# Patient Record
Sex: FEMALE | Race: WHITE | NOT HISPANIC OR LATINO | Employment: UNEMPLOYED | URBAN - METROPOLITAN AREA
[De-identification: names, ages, dates, MRNs, and addresses within clinical notes are randomized per-mention and may not be internally consistent; named-entity substitution may affect disease eponyms.]

---

## 2017-11-25 ENCOUNTER — HOSPITAL ENCOUNTER (EMERGENCY)
Facility: CLINIC | Age: 32
Discharge: HOME | End: 2017-11-25
Attending: EMERGENCY MEDICINE | Admitting: EMERGENCY MEDICINE
Payer: COMMERCIAL

## 2017-11-25 VITALS
HEIGHT: 69 IN | DIASTOLIC BLOOD PRESSURE: 91 MMHG | SYSTOLIC BLOOD PRESSURE: 146 MMHG | OXYGEN SATURATION: 97 % | RESPIRATION RATE: 18 BRPM | TEMPERATURE: 98 F
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
## 2017-11-25 DIAGNOSIS — R04.0 EPISTAXIS: ICD-10-CM

## 2017-11-25 DIAGNOSIS — F10.929 ALCOHOLIC INTOXICATION WITH COMPLICATION (H): ICD-10-CM

## 2017-11-25 LAB — ALCOHOL BREATH TEST: 0.34 (ref 0–0.01)

## 2017-11-25 PROCEDURE — 99283 EMERGENCY DEPT VISIT LOW MDM: CPT

## 2017-11-25 PROCEDURE — 82075 ASSAY OF BREATH ETHANOL: CPT

## 2017-11-25 NOTE — ED AVS SNAPSHOT
Emergency Department    [address] [place] [address]    Phone:  [phone]    Fax:  [phone]                                       Shala Mae   MRN: [id number]    Department:   Emergency Department   Date of Visit:  11/25/2017           After Visit Summary Signature Page     I have received my discharge instructions, and my questions have been answered. I have discussed any challenges I see with this plan with the nurse or doctor.    ..........................................................................................................................................  Patient/Patient Representative Signature      ..........................................................................................................................................  Patient Representative Print Name and Relationship to Patient    ..................................................               ................................................  Date                                            Time    ..........................................................................................................................................  Reviewed by Signature/Title    ...................................................              ..............................................  Date                                                            Time
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
## 2017-11-25 NOTE — ED NOTES
Bed: ED18  Expected date:   Expected time:   Means of arrival:   Comments:  422 32 F   ETOH  Head/neck pain

## 2017-11-26 NOTE — ED NOTES
Pt given boxed lunch. Ambulatory in room without difficulty. Still denying wanting PD notified or help with ETOH detox.
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
## 2017-11-26 NOTE — ED NOTES
Pt's father initiated call to ED and stated that he will not be coming to the ED. Dr. Lee at bedside.
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
## 2018-03-22 ENCOUNTER — HOSPITAL ENCOUNTER (EMERGENCY)
Facility: CLINIC | Age: 33
Discharge: OTHER INSTITUTION | End: 2018-03-22
Attending: EMERGENCY MEDICINE | Admitting: EMERGENCY MEDICINE
Payer: COMMERCIAL

## 2018-03-22 VITALS
RESPIRATION RATE: 18 BRPM | SYSTOLIC BLOOD PRESSURE: 122 MMHG | DIASTOLIC BLOOD PRESSURE: 80 MMHG | OXYGEN SATURATION: 98 % | HEART RATE: 120 BPM | TEMPERATURE: 98.5 F

## 2018-03-22 DIAGNOSIS — F10.220 ACUTE ALCOHOLIC INTOXICATION IN ALCOHOLISM WITHOUT COMPLICATION (H): ICD-10-CM

## 2018-03-22 LAB
AMPHETAMINES UR QL SCN: NEGATIVE
BARBITURATES UR QL: NEGATIVE
BENZODIAZ UR QL: NEGATIVE
CANNABINOIDS UR QL SCN: NEGATIVE
COCAINE UR QL: NEGATIVE
ETHANOL SERPL-MCNC: 0.38 G/DL
HCG UR QL: NEGATIVE
OPIATES UR QL SCN: NEGATIVE
PCP UR QL SCN: NEGATIVE

## 2018-03-22 PROCEDURE — 80307 DRUG TEST PRSMV CHEM ANLYZR: CPT | Performed by: EMERGENCY MEDICINE

## 2018-03-22 PROCEDURE — 99285 EMERGENCY DEPT VISIT HI MDM: CPT

## 2018-03-22 PROCEDURE — 80320 DRUG SCREEN QUANTALCOHOLS: CPT | Mod: 59 | Performed by: EMERGENCY MEDICINE

## 2018-03-22 PROCEDURE — 81025 URINE PREGNANCY TEST: CPT | Performed by: EMERGENCY MEDICINE

## 2018-03-22 RX ORDER — LORAZEPAM 2 MG/ML
INJECTION INTRAMUSCULAR
Status: DISCONTINUED
Start: 2018-03-22 | End: 2018-03-22 | Stop reason: WASHOUT

## 2018-03-22 NOTE — ED NOTES
Called [facility] to hold bed for pt.  No bed at [address] [facility], [facility] or [facility] detox

## 2018-03-22 NOTE — ED NOTES
DATE:  3/22/2018   TIME OF RECEIPT FROM LAB:  3:19 PM  LAB TEST:  ETOH  LAB VALUE:  0.38  RESULTS GIVEN WITH READ-BACK TO (PROVIDER):  Rose Ortega MD  TIME LAB VALUE REPORTED TO PROVIDER:   3:20 PM

## 2018-03-22 NOTE — ED PROVIDER NOTES
History     Chief Complaint:  Alcohol intoxication    HPI   Shala Mae is a 33 year old female with a history of alcohol abuse who presents with alcohol intoxication. The patient reports that she had been sober since her last visit to the emergency department but that she has recently begun drinking again. She recently broke up with her boyfriend, causing a large amount of stress. She endorses having had a 5 day drinking binge, with vodka as her beverage of choice. She denies any suicidal ideation. Of note the patient has been to treatment for her alcoholism in the past.    Allergies:  Gluten meal    Medications:    The patient is not currently taking any prescribed medications.     Past Medical History:    IUD in place  Bulimia  Chemical dependency  Depression    Past Surgical History:    Orthopedic surgery    Family History:    The patient denies any relevant family medical history.     Social History:  Smoking Status: No  Smokeless Tobacco: No  Alcohol Use: Yes   Marital Status:  Single [1]    Review of Systems   Psychiatric/Behavioral: Negative for self-injury and suicidal ideas.   All other systems reviewed and are negative.    Physical Exam   Vitals:  Patient Vitals for the past 24 hrs:   BP Temp Temp src Pulse Heart Rate Resp SpO2   03/22/18 1440 (!) 127/93 98.5  F (36.9  C) Oral 120 120 14 100 %        Physical Exam  Nursing note and vitals reviewed.    Constitutional:  Appears well-developed and well-nourished, comfortable.   HENT:    No evidence of facial or scalp trauma.      Nose normal.  No discharge.   Eyes:    Conjunctivae are normal without injection. No lid droop.     Pupils are equal, round, and reactive to light.   Cardiovascular:  Sinus tachycardia, regular rhythm with normal S1 and S2.      Normal heart sounds and peripheral pulses 2+ and equal.       No murmur or devonte.  Pulmonary:  Effort normal and breath sounds clear to auscultation bilaterally.  No respiratory distress.  No  stridor.     No wheezes. No rales. No chest wall tenderness.    GI:    Soft. No distension and no mass. No tenderness.      No rebound and no guarding. No HSM.  Neurological:   Speech is slightly slurred, but gait is normal.  No tremor.     Alert and oriented. No cranial nerve deficit, no facial droop.     Exhibits good muscle tone. Coordination normal. Gait is steady.     GCS eye subscore is 4. GCS verbal subscore is 5.      GCS motor subscore is 6.   Skin:    Skin is warm and dry. No rash noted. No diaphoresis.      No erythema. No pallor.  No lesions.  Psychiatric:   Behavior is normal. Sad, teary eyed. No suicidal ideation.     Judgment and thought content normal.       Emergency Department Course   Laboratory:  Laboratory findings were communicated with the patient who voiced understanding of the findings.  Drug abuse screen urine: Negative  HCG qualitative: Negative  Alcohol level:0.38    Emergency Department Course:  Nursing notes and vitals reviewed.  I performed an exam of the patient as documented above.   IV was inserted and blood was drawn for laboratory testing, results above.   The patient provided a urine sample here in the emergency department. This was sent for laboratory testing, findings above.     The patient was transferred to [facility]. All questions answered prior to transfer.    Impression & Plan      Medical Decision Making:  Patient comes in voluntarily wanting help with her drinking.  She is intoxicated.  She wants to go to detox.  She ultimately wants to get back into treatment.  She is not suicidal.  She is medically stable and clinically sober, she can ambulate without difficulty.  Her speech is clear.  Drug abuse screen is negative pregnancy test is negative.  The only detox bed that we can find because the others are all full is [facility].  They want a alcohol level on her and it is 0.38.  The patient will be transferred to [facility].    Diagnosis:    ICD-10-CM    1.  Acute alcoholic intoxication in alcoholism without complication (H) F10.220 Drug abuse screen urine        Disposition:   Transferred to [facility].    CMS Diagnoses: None     Scribe Disclosure:  I, Bernardo Carl, am serving as a scribe at 2:37 PM on 3/22/2018 to document services personally performed by Rose Ortega MD, based on my observations and the provider's statements to me.    EMERGENCY DEPARTMENT       Rose Ortega MD  03/22/18 1522

## 2018-03-22 NOTE — PHARMACY-ADMISSION MEDICATION HISTORY
Admission medication history interview status for the 3/22/2018  admission is complete. See EPIC admission navigator for prior to admission medications     Medication history source reliability:Good    Actions taken by pharmacist (provider contacted, etc): called [facility] pharmacy. Patient goes to 5 different [facility]. Patient is not on any medications.     Additional medication history information not noted on PTA med list :None    Medication reconciliation/reorder completed by provider prior to medication history? No    Time spent in this activity: 10 mins    Prior to Admission medications    Not on File

## 2018-03-22 NOTE — ED NOTES
Bed: ED18  Expected date:   Expected time:   Means of arrival:   Comments:  [facility] [place] [facility] [address] [facility] E[facility]h eta 1419

## 2018-04-27 ENCOUNTER — HOSPITAL ENCOUNTER (EMERGENCY)
Facility: CLINIC | Age: 33
Discharge: HOME | End: 2018-04-27
Attending: EMERGENCY MEDICINE | Admitting: EMERGENCY MEDICINE
Payer: COMMERCIAL

## 2018-04-27 VITALS
SYSTOLIC BLOOD PRESSURE: 128 MMHG | DIASTOLIC BLOOD PRESSURE: 89 MMHG | TEMPERATURE: 98.9 F | OXYGEN SATURATION: 99 % | RESPIRATION RATE: 16 BRPM

## 2018-04-27 DIAGNOSIS — F10.220 ACUTE ALCOHOLIC INTOXICATION IN ALCOHOLISM WITHOUT COMPLICATION (H): ICD-10-CM

## 2018-04-27 DIAGNOSIS — Z32.01 PREGNANCY TEST POSITIVE: ICD-10-CM

## 2018-04-27 LAB
ALCOHOL BREATH TEST: 0.33 (ref 0–0.01)
AMPHETAMINES UR QL SCN: NEGATIVE
B-HCG SERPL-ACNC: 18 IU/L (ref 0–5)
BARBITURATES UR QL: NEGATIVE
BENZODIAZ UR QL: POSITIVE
CANNABINOIDS UR QL SCN: NEGATIVE
COCAINE UR QL: NEGATIVE
HCG UR QL: POSITIVE
OPIATES UR QL SCN: NEGATIVE
PCP UR QL SCN: NEGATIVE

## 2018-04-27 PROCEDURE — 82075 ASSAY OF BREATH ETHANOL: CPT

## 2018-04-27 PROCEDURE — 84702 CHORIONIC GONADOTROPIN TEST: CPT | Performed by: EMERGENCY MEDICINE

## 2018-04-27 PROCEDURE — 80307 DRUG TEST PRSMV CHEM ANLYZR: CPT | Performed by: EMERGENCY MEDICINE

## 2018-04-27 PROCEDURE — 90791 PSYCH DIAGNOSTIC EVALUATION: CPT

## 2018-04-27 PROCEDURE — 99285 EMERGENCY DEPT VISIT HI MDM: CPT | Mod: 25

## 2018-04-27 PROCEDURE — 81025 URINE PREGNANCY TEST: CPT | Performed by: EMERGENCY MEDICINE

## 2018-04-27 ASSESSMENT — ENCOUNTER SYMPTOMS
VOMITING: 0
NECK PAIN: 0
HEADACHES: 0
SEIZURES: 0
NAUSEA: 0

## 2018-04-27 NOTE — ED AVS SNAPSHOT
Emergency Department    [address] [facility] [address]    Phone:  [phone]    Fax:  [phone]                                       Shala Mae   MRN: [id number]    Department:   Emergency Department   Date of Visit:  4/27/2018           Patient Information     Date Of Birth          1985        Your diagnoses for this visit were:     Acute alcoholic intoxication in alcoholism without complication (H)     Pregnancy test positive        You were seen by Linda Rodriguez MD.      Follow-up Information     Follow up with Clinic, [facility]. Schedule an appointment as soon as possible for a visit in 3 days.    Specialty:  Clinic    Contact information:    [address] [address] [phone]          Discharge Instructions       Please follow up with OBGYN next week to discuss your pregnancy.    You should stop or wean down on your alcohol use    Discharge Instructions  Alcohol Intoxication    You have been seen today with alcohol intoxication. This means that you have enough alcohol in your system to impair your ability to mentally and physically function, perhaps to the extent that you were unable to care for yourself.    Generally, every Emergency Department visit should have a follow-up clinic visit with either a primary or a specialty clinic/provider. Please follow-up as instructed by your emergency provider today.    You may have come to the Emergency Department because of your intoxication, or for another reason, such as because of an injury. No matter what the case is, this visit is a  red flag  regarding alcohol use, and you should consider whether your drinking pattern is a problem for you.     You may be at risk for alcohol-related problems if:      Men: you drink more than 14 drinks per week, or more than 4 drinks per occasion.      Women: you drink more than 7 drinks per week or more than 3 drinks per occasion.      You have  black-outs.    You do things you regret while drinking.    You have legal problems because of drinking.    You have job problems because of drinking (you call in sick to work because of drinking).    CAGE Questions    Have you ever felt you should cut down on your drinking?    Have people annoyed you by criticizing your drinking?    Have you ever felt bad or guilty about your drinking?    Have you ever had a drink first thing in the morning to steady your nerves or get rid of a hangover (eye opener)?    If you answer yes to any of the CAGE questions, you may have a problem with alcohol.      Return to the Emergency Department if:    You become shaky or tremble when you try to stop drinking.     You have severe abdominal pain (belly pain).     You have a seizure or pass out.      You vomit (throw up) blood or have blood in your stool. This may be bright red or it may look like black coffee grounds.    You become lightheaded or faint.      For further help, contact:     Your caregiver.      Alcoholics Anonymous (AA).    o [facility] Intergroup: [phone]  o [place] [facility] Central Office: [phone]     A drug or alcohol rehabilitation program.      You can get information on alcohol resources and groups by calling the number 211 or [phone] on any phone.     Seek medical care if:    You have persistent vomiting.     You have persistent pain in any part of your body.      You do not feel better after a few days.    If you were given a prescription for medicine here today, be sure to read all of the information (including the package insert) that comes with your prescription.  This will include important information about the medicine, its side effects, and any warnings that you need to know about.  The pharmacist who fills the prescription can provide more information and answer questions you may have about the medicine.  If you have questions or concerns that the pharmacist cannot  address, please call or return to the Emergency Department.   Remember that you can always come back to the Emergency Department if you are not able to see your regular doctor in the amount of time listed above, if you get any new symptoms, or if there is anything that worries you.      24 Hour Appointment Hotline       To make an appointment at any [facility], call [phone] ([phone]). If you don't have a family doctor or clinic, we will help you find one. [facility] are conveniently located to serve the needs of you and your family.             Review of your medicines      Notice     You have not been prescribed any medications.            Procedures and tests performed during your visit     Alcohol breath test POCT    Drug abuse screen 77 urine (FL, RH, SH)    HCG qualitative urine (UPT)    HCG quantitative pregnancy    HCG quantitative pregnancy (blood)      Orders Needing Specimen Collection     None      Pending Results     No orders found from 4/25/2018 to 4/28/2018.            Pending Culture Results     No orders found from 4/25/2018 to 4/28/2018.            Pending Results Instructions     If you had any lab results that were not finalized at the time of your Discharge, you can call the ED Lab Result RN at [phone]. You will be contacted by this team for any positive Lab results or changes in treatment. The nurses are available 7 days a week from 10A to 6:30P.  You can leave a message 24 hours per day and they will return your call.        Test Results From Your Hospital Stay        4/27/2018  2:50 PM      Component Results     Component Value Ref Range & Units Status    Alcohol Breath Test 0.331 (A) 0.00 - 0.01 Final         4/27/2018  3:45 PM      Component Results     Component Value Ref Range & Units Status    Amphetamine Qual Urine Negative NEG^Negative Final    Cutoff for a negative amphetamine is 500 ng/mL or less.    Barbiturates Qual Urine Negative NEG^Negative Final     Cutoff for a negative barbiturate is 200 ng/mL or less.    Benzodiazepine Qual Urine Positive (A) NEG^Negative Final    Cutoff for a positive benzodiazepine is greater than 200 ng/mL. This is an   unconfirmed screening result to be used for medical purposes only.      Cannabinoids Qual Urine Negative NEG^Negative Final    Cutoff for a negative cannabinoid is 50 ng/mL or less.    Cocaine Qual Urine Negative NEG^Negative Final    Cutoff for a negative cocaine is 300 ng/mL or less.    Opiates Qualitative Urine Negative NEG^Negative Final    Cutoff for a negative opiate is 300 ng/mL or less.    PCP Qual Urine Negative NEG^Negative Final    Cutoff for a negative PCP is 25 ng/mL or less.         4/27/2018  3:30 PM      Component Results     Component Value Ref Range & Units Status    HCG Qual Urine Positive (A) NEG^Negative Final    This test is for screening purposes.  Results should be interpreted along with   the clinical picture.  Confirmation testing is available if warranted by   ordering [id number], HCG Quantitative Pregnancy.           4/27/2018  6:39 PM      Component Results     Component Value Ref Range & Units Status    HCG Quantitative Serum 18 (H) 0 - 5 IU/L Final                Clinical Quality Measure: Blood Pressure Screening     Your blood pressure was checked while you were in the emergency department today. The last reading we obtained was  BP: 128/89 . Please read the guidelines below about what these numbers mean and what you should do about them.  If your systolic blood pressure (the top number) is less than 120 and your diastolic blood pressure (the bottom number) is less than 80, then your blood pressure is normal. There is nothing more that you need to do about it.  If your systolic blood pressure (the top number) is 120-139 or your diastolic blood pressure (the bottom number) is 80-89, your blood pressure may be higher than it should be. You should have your blood pressure rechecked within a year by  "a primary care provider.  If your systolic blood pressure (the top number) is 140 or greater or your diastolic blood pressure (the bottom number) is 90 or greater, you may have high blood pressure. High blood pressure is treatable, but if left untreated over time it can put you at risk for heart attack, stroke, or kidney failure. You should have your blood pressure rechecked by a primary care provider within the next 4 weeks.  If your provider in the emergency department today gave you specific instructions to follow-up with your doctor or provider even sooner than that, you should follow that instruction and not wait for up to 4 weeks for your follow-up visit.        Thank you for choosing [facility]       Thank you for choosing [facility] for your care. Our goal is always to provide you with excellent care. Hearing back from our patients is one way we can continue to improve our services. Please take a few minutes to complete the written survey that you may receive in the mail after you visit with us. Thank you!        [employer] Information     [employer] lets you send messages to your doctor, view your test results, renew your prescriptions, schedule appointments and more. To sign up, go to [email or web address] . Click on \"Log in\" on the left side of the screen, which will take you to the Welcome page. Then click on \"Sign up Now\" on the right side of the page.     You will be asked to enter the access code listed below, as well as some personal information. Please follow the directions to create your username and password.     Your access code is: [email or web address]  Expires: 2018  8:14 PM     Your access code will  in 90 days. If you need help or a new code, please call your [facility] clinic or [phone].        Care EveryWhere ID     This is your Care EveryWhere ID. This could be used by other organizations to access your [facility] medical records  [id number]        Equal Access to Services     TASHA KELLY AH: " Hadii yisel Durand, wabobda kyung, qahortensiata kaalsukhjinder cutler. So [facility] [phone].    ATENCIÓN: Si habla español, tiene a zuñiga disposición servicios gratuitos de asistencia lingüística. Llame al [phone].    We comply with applicable federal civil rights laws and [place] laws. We do not discriminate on the basis of race, color, national origin, age, disability, sex, sexual orientation, or gender identity.            After Visit Summary       This is your record. Keep this with you and show to your community pharmacist(s) and doctor(s) at your next visit.

## 2018-04-27 NOTE — ED NOTES
Bed: ED17  Expected date:   Expected time:   Means of arrival:   [phone] hold eta [phone]

## 2018-04-27 NOTE — ED PROVIDER NOTES
History     Chief Complaint:  Alcohol Intoxication    HPI   Shala Mae is a 33 year old female, with a history of alcoholism and depression, who presents with her parents to the ED for evaluation of alcohol intoxication. The patient reports she is an alcoholic and has been drinking too much. She has been sober for 34 days but relapsed 3 days ago. She has been drinking about half a liter of vodka per day. The patient notes she fell and hit her right forehead today. She is unsure what she hit her forehead on. The patient denies any suicidal ideation, blood thinners, alcohol withdrawal, withdrawal seizures, substance use, tobacco use, loss of consciousness, headaches, visual disturbance, neck pain, nausea, or vomiting. Of note, the patient reports she would like to go home.     Per the patient's parents, she has been an alcoholic for 8 years and has been through AA treatment multiple times. The mother reports her longest sober period was 100 days. She has been seen in the ED x4 with the most recent episode on 3/18/18. She had been doing well for the past month. They check-up on her daily. She had not replied to their texts the last few days. Therefore, they checked on her today and found her barely responding in a basement room. The father notes he is 95% positive she will drink again once she returns home since she resides by herself. He would like to petition to commit her if she is not voluntary. She has made suicidal comments in the past but none today.     Allergies:  No known drug allergies    Medications:    The patient is not currently taking any prescribed medications.    Past Medical History:    Bulimia  Chemical dependency  Depression  Alcoholism    Past Surgical History:    Orthopedic surgery     Family History:    History reviewed. No pertinent family history.     Social History:  Smoking status: Never smoker   Alcohol use: Yes, history of alcoholism, last sober 34 days, drinks 0.5L vodka/day    Presents to ED with parents    Marital Status:  Single [1]     Review of Systems   Gastrointestinal: Negative for nausea and vomiting.   Musculoskeletal: Negative for neck pain.   Neurological: Negative for seizures, syncope and headaches.   Psychiatric/Behavioral: Positive for suicidal ideas.   All other systems reviewed and are negative.    Physical Exam     Patient Vitals for the past 24 hrs:   BP Temp Temp src Heart Rate Resp SpO2   04/27/18 2034 - - - 91 16 99 %   04/27/18 1438 128/89 98.9  F (37.2  C) Oral 93 - 98 %     Physical Exam  Physical Exam   General:  Sitting on bed, comfortable appearing.   Head:  No obvious trauma to head, no ecchymosis   Ears, Nose:  External ears normal.  Nose normal.   Eyes:  Conjunctivae clear.  Pupils round and symmetry.    Neck:  Normal range of motion. Neck supple and symmetric.    Pulm/Chest:  No respiratory distress.  Breathing comfortably.    CV: Regular rate and rhythm.    MSK:  Normal range of motion.   Neuro:  Alert. Moving all extremities.  GCS 15.  Motor intact.  Sensation intact.    Skin:  Skin is warm and dry.    Psych:  Normal mood and affect. Behavior is normal.     Emergency Department Course     Laboratory:  Alcohol breath test POCT: 0.331    UDS: Benzodiazepine positive   HCG urine-UPT: Positive     HCG pregnancy blood: 18(H)    Emergency Department Course:  Patient arrived by EMS.     Past medical records, nursing notes, and vitals reviewed.  1540: I performed an exam of the patient and obtained history, as documented above.    1800: I rechecked the patient.    1934: I rechecked the patient. Explained findings to patient and parents.    2012: I spoke with DEC after his evaluation of the patient in the ED.     2022: I rechecked the patient. Explained plan to patient and parents.    Findings and plan explained to the Patient and mother and father. Patient discharged home with instructions regarding supportive care, medications, and reasons to return. The  importance of close follow-up was reviewed.     Impression & Plan      Medical Decision Making:  Shala Mae is a 33 year old female with a history of alcohol abuse who presents with alcohol intoxication. Vital signs are unremarkable. Broad differential pursued including but not limited to alcohol intoxication, drug abuse, ingestion, etc. Overall, patient's well-appearing, non-toxic. No evidence of trauma on examination. She said she hit her head, but there's no evidence of ecchymosis or bruising. She denies loss of consciousness. GCS 15.  No indication that she need an emergent head CT per French head CT rules. Urine drug screen was positive for Benzos, but otherwise unremarkable. Urine pregnancy test is positive; therefore, a quant was obtained and positive at 18. This was relayed to the patient. She has no vaginal bleeding or discharge. She has no abdominal pain. At this time, there's no indication for any other abnormalities to indicate further workup regarding the pregnancy. Advised that she follow-up with her OB next week. Her breathalyzer was .331. This is consistent with her prior presentations. Patient made vague statements of harm to parents. Therefore, DEC was consulted. Patient was reassessed and was more clinically sober. DEC saw and evaluated the patient. At this point, she appears safe to be discharged home. With her new pregnancy, she is going to work to stop drinking. She was offered detox but would prefer to go home at this time. Patient will be discharged to home with parents driving her home. Patient will be reported to CPS as well given her positive pregnancy test and alcohol intoxication. At this time, patient is otherwise well-appearing, non-toxic, and clinically sober without any immediate threat to self or others. She appears able to care for herself. She appears to be appropriate for discharge to home.     Diagnosis:    ICD-10-CM   1. Acute alcoholic intoxication in alcoholism without  complication (H) F10.220   2. Pregnancy test positive Z32.01     Disposition: Patient discharged to home with parents      Keren Leta  4/27/2018    EMERGENCY DEPARTMENT    I, Keren Gillette, am serving as a scribe at 3:40 PM on 4/27/2018 to document services personally performed by Linda Rodriguez MD based on my observations and the provider's statements to me.          Linda Rodriguez MD  04/27/18 2147

## 2018-04-27 NOTE — ED AVS SNAPSHOT
Emergency Department    [address] [place] [address]    Phone:  [phone]    Fax:  [phone]                                       Shala Mae   MRN: [id number]    Department:   Emergency Department   Date of Visit:  4/27/2018           After Visit Summary Signature Page     I have received my discharge instructions, and my questions have been answered. I have discussed any challenges I see with this plan with the nurse or doctor.    ..........................................................................................................................................  Patient/Patient Representative Signature      ..........................................................................................................................................  Patient Representative Print Name and Relationship to Patient    ..................................................               ................................................  Date                                            Time    ..........................................................................................................................................  Reviewed by Signature/Title    ...................................................              ..............................................  Date                                                            Time

## 2018-04-28 NOTE — DISCHARGE INSTRUCTIONS
Please follow up with OBGYN next week to discuss your pregnancy.    You should stop or wean down on your alcohol use    Discharge Instructions  Alcohol Intoxication    You have been seen today with alcohol intoxication. This means that you have enough alcohol in your system to impair your ability to mentally and physically function, perhaps to the extent that you were unable to care for yourself.    Generally, every Emergency Department visit should have a follow-up clinic visit with either a primary or a specialty clinic/provider. Please follow-up as instructed by your emergency provider today.    You may have come to the Emergency Department because of your intoxication, or for another reason, such as because of an injury. No matter what the case is, this visit is a  red flag  regarding alcohol use, and you should consider whether your drinking pattern is a problem for you.     You may be at risk for alcohol-related problems if:      Men: you drink more than 14 drinks per week, or more than 4 drinks per occasion.      Women: you drink more than 7 drinks per week or more than 3 drinks per occasion.      You have black-outs.    You do things you regret while drinking.    You have legal problems because of drinking.    You have job problems because of drinking (you call in sick to work because of drinking).    CAGE Questions    Have you ever felt you should cut down on your drinking?    Have people annoyed you by criticizing your drinking?    Have you ever felt bad or guilty about your drinking?    Have you ever had a drink first thing in the morning to steady your nerves or get rid of a hangover (eye opener)?    If you answer yes to any of the CAGE questions, you may have a problem with alcohol.      Return to the Emergency Department if:    You become shaky or tremble when you try to stop drinking.     You have severe abdominal pain (belly pain).     You have a seizure or pass out.      You vomit (throw up) blood  or have blood in your stool. This may be bright red or it may look like black coffee grounds.    You become lightheaded or faint.      For further help, contact:     Your caregiver.      Alcoholics Anonymous (AA).    o [facility] Intergroup: [phone]  o [facility] Central Office: [phone]     A drug or alcohol rehabilitation program.      You can get information on alcohol resources and groups by calling the number 211 or [phone] on any phone.     Seek medical care if:    You have persistent vomiting.     You have persistent pain in any part of your body.      You do not feel better after a few days.    If you were given a prescription for medicine here today, be sure to read all of the information (including the package insert) that comes with your prescription.  This will include important information about the medicine, its side effects, and any warnings that you need to know about.  The pharmacist who fills the prescription can provide more information and answer questions you may have about the medicine.  If you have questions or concerns that the pharmacist cannot address, please call or return to the Emergency Department.   Remember that you can always come back to the Emergency Department if you are not able to see your regular doctor in the amount of time listed above, if you get any new symptoms, or if there is anything that worries you.

## 2018-06-12 ENCOUNTER — HOSPITAL ENCOUNTER (EMERGENCY)
Facility: CLINIC | Age: 33
Discharge: OTHER INSTITUTION | End: 2018-06-13
Attending: EMERGENCY MEDICINE | Admitting: EMERGENCY MEDICINE
Payer: COMMERCIAL

## 2018-06-12 DIAGNOSIS — F10.220 ACUTE ALCOHOLIC INTOXICATION IN ALCOHOLISM WITHOUT COMPLICATION (H): ICD-10-CM

## 2018-06-12 LAB
ALBUMIN SERPL-MCNC: 4 G/DL (ref 3.4–5)
ALP SERPL-CCNC: 51 U/L (ref 40–150)
ALT SERPL W P-5'-P-CCNC: 126 U/L (ref 0–50)
AMPHETAMINES UR QL SCN: NEGATIVE
ANION GAP SERPL CALCULATED.3IONS-SCNC: 15 MMOL/L (ref 3–14)
AST SERPL W P-5'-P-CCNC: 143 U/L (ref 0–45)
BARBITURATES UR QL: NEGATIVE
BENZODIAZ UR QL: NEGATIVE
BILIRUB SERPL-MCNC: 0.5 MG/DL (ref 0.2–1.3)
BUN SERPL-MCNC: 14 MG/DL (ref 7–30)
CALCIUM SERPL-MCNC: 8.4 MG/DL (ref 8.5–10.1)
CANNABINOIDS UR QL SCN: NEGATIVE
CHLORIDE SERPL-SCNC: 103 MMOL/L (ref 94–109)
CO2 SERPL-SCNC: 23 MMOL/L (ref 20–32)
COCAINE UR QL: NEGATIVE
CREAT SERPL-MCNC: 0.74 MG/DL (ref 0.52–1.04)
ERYTHROCYTE [DISTWIDTH] IN BLOOD BY AUTOMATED COUNT: 13.7 % (ref 10–15)
ETHANOL SERPL-MCNC: 0.48 G/DL
GFR SERPL CREATININE-BSD FRML MDRD: 89 ML/MIN/1.7M2
GLUCOSE SERPL-MCNC: 72 MG/DL (ref 70–99)
HCG UR QL: NEGATIVE
HCT VFR BLD AUTO: 43.4 % (ref 35–47)
HGB BLD-MCNC: 15 G/DL (ref 11.7–15.7)
MCH RBC QN AUTO: 32 PG (ref 26.5–33)
MCHC RBC AUTO-ENTMCNC: 34.6 G/DL (ref 31.5–36.5)
MCV RBC AUTO: 93 FL (ref 78–100)
OPIATES UR QL SCN: NEGATIVE
PCP UR QL SCN: NEGATIVE
PLATELET # BLD AUTO: 264 10E9/L (ref 150–450)
POTASSIUM SERPL-SCNC: 4.3 MMOL/L (ref 3.4–5.3)
PROT SERPL-MCNC: 7.5 G/DL (ref 6.8–8.8)
RBC # BLD AUTO: 4.69 10E12/L (ref 3.8–5.2)
SODIUM SERPL-SCNC: 141 MMOL/L (ref 133–144)
WBC # BLD AUTO: 6.6 10E9/L (ref 4–11)

## 2018-06-12 PROCEDURE — 85027 COMPLETE CBC AUTOMATED: CPT | Performed by: EMERGENCY MEDICINE

## 2018-06-12 PROCEDURE — 99285 EMERGENCY DEPT VISIT HI MDM: CPT | Mod: 25

## 2018-06-12 PROCEDURE — 80307 DRUG TEST PRSMV CHEM ANLYZR: CPT | Performed by: EMERGENCY MEDICINE

## 2018-06-12 PROCEDURE — 80053 COMPREHEN METABOLIC PANEL: CPT | Performed by: EMERGENCY MEDICINE

## 2018-06-12 PROCEDURE — 81025 URINE PREGNANCY TEST: CPT | Performed by: EMERGENCY MEDICINE

## 2018-06-12 PROCEDURE — 80320 DRUG SCREEN QUANTALCOHOLS: CPT | Performed by: EMERGENCY MEDICINE

## 2018-06-12 NOTE — ED NOTES
Bed: [facility]  Expected date:   Expected time:   Means of arrival:   Comments:  ETOH [facility] here now

## 2018-06-12 NOTE — ED NOTES
Report received. Assumed care of pt. Pt on Health Officer Hold by previous staff with security present and will remain on that while in the ED. Belongings done by previous staff.

## 2018-06-12 NOTE — ED PROVIDER NOTES
"  History     Chief Complaint:  Alcohol Intoxication    HPI   Shala Mae is a 33 year old female who presents by ambulance after her friend found her intoxicated and unable to care for herself.  The patient has been in with an alcohol level as high as 0.57 in the past.  No report of previous drug use or drug use this time.  No trauma, she had fallen as far as her friend knew.  Her friend wants her to be committed or to get into detox.  The patient gives no history but just looks at you when you talk to her.  She has been through treatment many times according to the friend.  No report of being suicidal but her friends said while she was sitting here that she texted her saying \"F--- life\".      Allergies:  No known drug allergies    Medications:    The patient is currently on no regular medications.    Past Medical History:    Bulimia  Chemical dependency  Depression    Past Surgical History:    Orthopedic surgery    Family History:    History reviewed. No pertinent family history.     Social History:  Smoking status: Never smoker  Alcohol use: Yes  Marital Status:  Single [1]    Review of Systems   Unable to perform ROS: Mental status change     Physical Exam     Patient Vitals for the past 24 hrs:   BP Temp Temp src Heart Rate Resp SpO2 Height Weight   06/12/18 1355 - 98.3  F (36.8  C) Oral - - - - -   06/12/18 1340 120/83 - - 95 16 96 % 1.778 m (5' 10\") 68 kg (150 lb)       Physical Exam  Nursing note and vitals reviewed.    Constitutional:  Appears intoxicated, will not talk but just looks at me.  Seems sleepy.  HENT:     No evidence of facial or scalp trauma.      Nose normal.  No discharge.      Oropharynx is clear and moist.  Eyes:    Conjunctivae are normal without injection. No lid droop.     Pupils are equal, round, and reactive to light.      Right eye exhibits no discharge. Left eye exhibits no discharge.      No scleral icterus.  Cardiovascular:  Normal rate, regular rhythm with normal S1 and S2. "      Normal heart sounds and peripheral pulses 2+ and equal.       No murmur or devonte.  Pulmonary:  Effort normal and breath sounds clear to auscultation bilaterally.          No respiratory distress.  No stridor.     No wheezes. No rales. .    GI:    Soft. No distension and no mass. No apparent tenderness.      No rebound and no guarding. No flank pain.  No HSM.  Musculoskeletal:  Normal range of motion. No extremity deformity.     No edema and no tenderness.  No cyanosis.                                       Neck supple, no midline cervical tenderness.   Neurological:   Alert but intoxicated and refuses to talk.      No cranial nerve deficit, no facial droop.  No focal weakness.     GCS eye subscore is 4. GCS verbal subscore is 5.      GCS motor subscore is 6.   Skin:    Skin is warm and dry. No rash noted. No diaphoresis.      No erythema. No pallor.  No lesions.  Psychiatric:   Very intoxicated, patient stares at you will open her eyes but does not talk.  However, she was texting her friend while in the emergency room.    Emergency Department Course   Laboratory:  CBC: WNL (WBC 6.6, HGB 15.0, )   CMP: Calcium 8.4 (L),  (H),  (H) o/w WNL (Creatinine 0.74)  Alcohol Level Blood: 0.48 (HH)  HCG Urine: Negative  Drug Abuse Screen: Negative    Emergency Department Course:  Past medical records, nursing notes, and vitals reviewed.  1354: I performed an exam of the patient and obtained history, as documented above.  IV inserted and blood drawn.    Impression & Plan    Medical Decision Making:  Patient is very intoxicated.  She would not talk to me or provide any history to myself or the nurse.  Her blood alcohol is 0.48.  I talked to her friend who is very concerned about the patient wants her to get help.  I explained to her that we would not be able to assess her mental status until she is more sober.  At that time if she has suicidal ideation, she will need a DEC assessment.  Apparently she has  had alcohol withdrawal symptoms in the past and will need to be watched closely for this as she any up.  Her friend was given information on a crisis assessment as well as on a commitment.  Detox is a option potentially at [facility] but will need to be reevaluated when she is more sober.    Diagnosis:    ICD-10-CM   1. Acute alcoholic intoxication in alcoholism without complication (H) F10.220       Disposition:  She will board in the ER until she is clinically sober.  At that time the physician will reassess her and will have a DEC assessment if needed or indicated.  Detox will be considered at that time.    Jason Chadwick  6/12/2018    EMERGENCY DEPARTMENT  I, Jason Chadwick, am serving as a scribe at 1:54 PM on 6/12/2018 to document services personally performed by Rose Ortega MD based on my observations and the provider's statements to me.        Rose Ortega MD  06/12/18 7401

## 2018-06-12 NOTE — ED NOTES
Pt laying in bed soaked with urine and IV laying on floor in small puddle of blood. Catheter intact and bleeding controlled. Pt changed into clean scrubs and Depends placed. New linens placed on bed. Pt cooperative.

## 2018-06-13 VITALS
SYSTOLIC BLOOD PRESSURE: 125 MMHG | RESPIRATION RATE: 16 BRPM | HEIGHT: 70 IN | DIASTOLIC BLOOD PRESSURE: 87 MMHG | OXYGEN SATURATION: 97 % | BODY MASS INDEX: 21.47 KG/M2 | TEMPERATURE: 98.3 F | WEIGHT: 150 LBS

## 2018-06-13 NOTE — ED NOTES
Pt has a friend at bedside visiting. Visitor was observed placing his belongings into a visitor locker.

## 2018-06-13 NOTE — ED PROVIDER NOTES
Visit Date:   2018      HISTORY OF PRESENT ILLNESS:  A 33-year-old woman who presented to the Emergency Department intoxicated with a breathalyzer at 0.48.  She was seen initially by Dr. Rose Ortega and signed out to me.  During this time, she is awakened and is completely clear thinking, no suicidal thoughts and wanting help.  Her mother is also here.  Her mother has contacted an alcohol center and the patient can go there in approximately 2 days when she is sober. We have contacted [facility], a bed is available and the patient will be transferred there.  I have placed her on a transfer hold.      IMPRESSION:  Acute on chronic alcohol intoxication.      PLAN: As noted.         IVAN GARCIA MD             D: 2018   T: 2018   MT:       Name:     LU JARQUIN   MRN:      [id number]        Account:      [id number]   :      1985           Visit Date:   2018      Document: [id number]

## 2018-06-13 NOTE — ED NOTES
[facility], [address] [place] [address] and [facility] have no detox beds available. Left message for nurse at [facility] to call me back to see if they have a female bed available.

## 2018-06-27 ENCOUNTER — HOSPITAL ENCOUNTER (EMERGENCY)
Facility: CLINIC | Age: 33
Discharge: HOME | End: 2018-06-27
Attending: EMERGENCY MEDICINE | Admitting: EMERGENCY MEDICINE
Payer: COMMERCIAL

## 2018-06-27 VITALS
OXYGEN SATURATION: 100 % | HEIGHT: 70 IN | SYSTOLIC BLOOD PRESSURE: 125 MMHG | TEMPERATURE: 98.2 F | RESPIRATION RATE: 14 BRPM | BODY MASS INDEX: 21.47 KG/M2 | WEIGHT: 150 LBS | DIASTOLIC BLOOD PRESSURE: 80 MMHG | HEART RATE: 90 BPM

## 2018-06-27 DIAGNOSIS — F10.220 ACUTE ALCOHOLIC INTOXICATION IN ALCOHOLISM WITHOUT COMPLICATION (H): ICD-10-CM

## 2018-06-27 LAB
ALCOHOL BREATH TEST: 0.34 (ref 0–0.01)
HCG UR QL: NEGATIVE

## 2018-06-27 PROCEDURE — 81025 URINE PREGNANCY TEST: CPT | Performed by: EMERGENCY MEDICINE

## 2018-06-27 PROCEDURE — 82075 ASSAY OF BREATH ETHANOL: CPT

## 2018-06-27 PROCEDURE — 99284 EMERGENCY DEPT VISIT MOD MDM: CPT

## 2018-06-27 NOTE — ED AVS SNAPSHOT
Emergency Department    [address] [facility] [address]    Phone:  [phone]    Fax:  [phone]                                       Shala Mae   MRN: [id number]    Department:   Emergency Department   Date of Visit:  6/27/2018           Patient Information     Date Of Birth          1985        Your diagnoses for this visit were:     Acute alcoholic intoxication in alcoholism without complication (H)        You were seen by Mario Owen MD.      Follow-up Information     Follow up with Clinic, [facility].    Specialty:  Clinic    Contact information:    [address] [address] [phone]          Discharge Instructions         Alcohol Intoxication  Alcohol intoxication occurs when you drink alcohol faster than your liver can remove it from your system. The following facts are important to remember:    It can take 10 minutes or more to start to feel the effects of a drink, so you can easily get more intoxicated than you intended.    One drink may be more than 1 serving of alcohol. Depending on the drink, it can be 2 to 4 servings.    It takes about an hour for your body to metabolize (clear) 1 serving. If you have more than 1 drink, it can take a couple of hours or more.    Many things affect how drinks will affect you, including whether you ve eaten, how fast you drink, your size, how much you normally drink (or not), medicines you take, chronic diseases you have, and gender.  Signs and symptoms of alcohol poisoning  The following are signs and symptoms of alcohol poisoning:  Mild impairment    Reduced inhibitions    Slurred speech    Drowsiness    Decreased fine motor skills  Moderate impairment    Erratic behavior, aggression, depression    Impaired judgment    Confusion    Concentration difficulties    Coordination problems  Severe impairment    Vomiting    Seizures    Unconsciousness    Cold, clammy    Slow or irregular  "breathing    Hypothermia (low body temperature)    Coma  Health effects  Alcohol abuse causes health problems. Sometimes this can happen after only drinking a  little.\" There is no set number of drinks or amount of alcohol that defines too much. The more you drink at one time, and the more frequently you drink determine both the short-term and long-term health effects. It affects all parts of your body and your health, including your:    Brain. Alcohol is a central nervous system depressant. It can damage parts of the brain that affect your balance, memory, thinking, and emotions. It can cause memory loss, blackouts, depression, agitation, sleep cycle changes, and seizures. These changes may or may not be reversible.    Heart and vascular system. Alcohol affects multiple areas. It can damage heart muscle causing cardiomyopathy, which is a weakening and stretching of the heart muscle. This can lead to trouble breathing, an irregular heartbeat, atrial fibrillation, leg swelling, and heart failure. It makes the blood vessels stiffen causing hypertension (high blood pressure). All of these problems increase your risk of having heart attacks or strokes.    Liver. Alcohol causes fat to build up in the liver, affecting its normal function. This increases the risk for hepatitis, leading to abdominal pain, appetite loss, jaundice, bleeding problems, liver fibrosis, and cirrhosis. This in turn can affect your ability to fight off infections, and can cause diabetes. The liver changes prevent it from removing toxins in your blood that can cause encephalopathy. Signs of this are confusion, altered level of consciousness, personality changes, memory loss, seizures, coma, and death.    Pancreas. Alcohol can cause inflammation of the pancreas, or pancreatitis. This can cause pain in your abdomen, fever, and diabetes.    Immune system. Alcohol weakens your immune system in a number of ways. It suppresses your immune system making it " harder to fight off infections and colds. You will also have a higher risk of certain infections like pneumonia and tuberculosis.    Cancer risk. Alcohol raises your risk of cancer of the mouth, esophagus, pharynx, larynx, liver, and breast.    Sexual function. Alcohol abuse can also lead to sexual problems.  Alcohol use during pregnancy may cause permanent damage to the growing baby.  Home care  The following guidelines will help you care for yourself at home:    Don't drink any more alcohol.    Don't drive until all effects of the alcohol have worn off.    Don't operate machinery that can cause injuries.    Get lots of rest over the next few days. Drink plenty of water and other non-alcoholic liquids. Try to eat regular meals.    If you have been drinking heavily on a daily basis, you may go through alcohol withdrawal. The usual symptoms last 3 to 4 days and may include nervousness, shakiness, nausea, sweating, sleeplessness, and can even cause seizures and a serious withdrawal symptom called delirium tremens, or DTs. During this time, it is best that you stay with family or friends who can help and support you. You can also admit yourself to a residential detox program. If your symptoms are severe (seizures, severe shakiness, confusion), contact your doctor or call an ambulance for help (see below).   Follow-up care  If alcohol is a problem in your life, these are some organizations that can help you:    Alcoholics Anonymous offers support through a self-help fellowship. There are no dues or fees. See the Yellow Pages and call for time and place of meetings. Find AA online at www.aa.org.    Rafael offers support to families of alcohol users. Contact 573-381-8309, or online at www.al-anomagda.org.    National [place] on Alcoholism and Drug Dependence can be reached at 260-278-9457, or online at www.ncadd.org.    There are also inpatient and residential alcohol detox programs. Check the Internet or phonebook Yellow  Pages under  Drug Abuse and Treatment Centers.   Call 911  Call 911 if any of these occur:    Trouble breathing or slow irregular breathing    Chest pain    Sudden weakness on one side of your body or sudden trouble speaking    Heavy bleeding or vomiting blood    Very drowsy or trouble awakening    Fainting or loss of consciousness    Rapid heart rate    Seizure  When to seek medical advice  Call your healthcare provider right away if any of these occur:    Severe shakiness     Fever of 100.4 F (38 C) or higher, or as directed by your healthcare provider    Confusion or hallucinations (seeing, hearing, or feeling things that are not there)    Pain in your upper abdomen that gets worse    Repeated vomiting  Date Last Reviewed: 6/1/2016 2000-2017 [employer]. [address] [place] [address], [place] [address]. All rights reserved. This information is not intended as a substitute for professional medical care. Always follow your healthcare professional's instructions.        Understanding Alcoholism  Alcoholism is a disease in which a person is dependent on a drug--alcohol. The disease can harm the person s physical and mental health. It can also affect his or her behavior. Alcoholism is not a character flaw. It is not a moral failure. It is a disease that worsens over time. Untreated, it can lead to brain damage and death. Recovery is possible if drinking is stopped.  Effects of alcoholism  Behavioral effects  Drinking is the main behavior of people with alcoholism. They develop a private relationship with drinking. They guard it. They give it their time, money, and attention. This may happen at the expense of family and friends. They lie for it and think about it all the time. They may risk losing their families for it. They may risk their lives for it. Despite the harm it causes, they can t control the drinking.  Health risks  People with alcoholism are at high risk for health problems. These include heart  disease and cancer. They also include mental illness. People with the disease may not heal from illness normally. Unless drinking is stopped, it can cause death. Death may result from organ failure, cancer, or common viruses. Death may also result from accidents or suicide.  Physical effects  Alcohol can be like a poison to the body. It kills cells. Heavy drinking over a long time can greatly harm the body s organs. These can include the brain, heart, liver, and pancreas. Chronic drinking also harms the digestive tract. It harms the immune system as well. This leaves the body vulnerable to serious disease.  Psychological effects  Alcoholism can lead to a type of distorted thinking known as  alcohol-think.  One of the most common forms of this is denial. This is when the person denies that drinking is a problem. He or she may deny that any of the problems in his or her life are caused by drinking.  Date Last Reviewed: 6/1/2016 2000-2017 [employer]. [address] [place] [address]. All rights reserved. This information is not intended as a substitute for professional medical care. Always follow your healthcare professional's instructions.          Alcoholism: Resources for Family and Friends  Are you affected by a loved one s drinking? This sheet tells you about resources to help you cope with this problem.    [employer]in [employer] is a self-help program. It is for adult family and friends of people with alcoholism. [employer]on has the same support for teenagers. These are no-cost support groups. The program is based on Alcoholics Anonymous (AA). To find a meeting, you can contact them online or by phone at:    www.devin.alateen.org    546.817.6720  Adult children of alcoholics (JENNIFER)  This group helps people who grew up in a home with a parent or primary caregiver with alcoholism. It can help you get past harmful thoughts and behaviors that are the result of this. To find a meeting,  you can contact them online or by phone at:    www.AdultChildren.org    [phone]  Other resources  There are many resources to help you cope. These include:    Professional care facilities and providers    Self-help groups     Referral services    You can also seek the help of a professional who works with families. You may learn about ways to help a family member who refuses treatment.   To find these and other sources of help, try the websites below.   Helpful websites    National [place] on Alcohol Abuse and Addiction    www.niaaa.nih.gov/alcohol-health    National [place] on Alcoholism and Drug Dependence, Inc. (NCADD)    www.ncadd.org    Silva and Janet.al-kristy.alateen.org/S17web.html     Alcoholics Anonymouswww.aa.org   Date Last Reviewed: 2/1/2017 2000-2017 [employer]. [address] [place] [address]. All rights reserved. This information is not intended as a substitute for professional medical care. Always follow your healthcare professional's instructions.        Alcoholism: How to Be Part of the Solution  Family members can play a part in helping a loved one stop drinking. It may mean changing some of their own behavior. This can help their family member with recovery. Read below to learn more.    Being part of the problem  Enabling  People with alcoholism often need the help of others to keep drinking. They may need people to make excuses for them. They may need people to rescue them and take care of them. Giving this kind of help to a person with alcoholism is known as  enabling.  Family members may do this without even knowing it.  Letting it harm your own life  When a family member tries to care for a person with alcoholism, it can be hard to live a normal life of his or her own. This can be bad for his or her own mental health. This can also harm the lives of children in the family.  Getting nowhere  As long as people stick to the behaviors that allow alcoholism  to control their lives, they are on a treadmill that never stops. To get off this treadmill, family members have to stop being part of the problem and start being part of the solution.   Being part of the solution  Don t enable  Family members need to end the support that enables the person with alcoholism to drink. A good way to start is by letting the person know and understand the effects of his or her drinking. This can help him or her face reality.  Focus on your own needs  Family members can stop letting their thoughts and actions be ruled by concerns about the person with alcoholism. They can then be free from that person s illness. By taking care of their own needs first, they can make progress toward a more normal life.  Get help  A family member cannot help someone overcome his or her alcoholism alone. There is help for those who can accept it. Learning how to deal with the disease may mean new ways of thinking. It may mean learning new behavior. Family members can be greatly helped by the guidance and support of others.  Date Last Reviewed: 6/1/2016 2000-2017 [employer]. [address] [place] [address]. All rights reserved. This information is not intended as a substitute for professional medical care. Always follow your healthcare professional's instructions.              24 Hour Appointment Hotline       To make an appointment at any [facility], call [phone] ([phone]). If you don't have a family doctor or clinic, we will help you find one. [facility] are conveniently located to serve the needs of you and your family.             Review of your medicines      Notice     You have not been prescribed any medications.            Procedures and tests performed during your visit     Alcohol breath test POCT    HCG qualitative urine      Orders Needing Specimen Collection     None      Pending Results     No orders found from 6/25/2018 to 6/28/2018.             Pending Culture Results     No orders found from 6/25/2018 to 6/28/2018.            Pending Results Instructions     If you had any lab results that were not finalized at the time of your Discharge, you can call the ED Lab Result RN at [phone]. You will be contacted by this team for any positive Lab results or changes in treatment. The nurses are available 7 days a week from 10A to 6:30P.  You can leave a message 24 hours per day and they will return your call.        Test Results From Your Hospital Stay        6/27/2018  1:04 PM      Component Results     Component Value Ref Range & Units Status    Alcohol Breath Test 0.338 (A) 0.00 - 0.01 Final         6/27/2018  2:25 PM      Component Results     Component Value Ref Range & Units Status    HCG Qual Urine Negative NEG^Negative Final    This test is for screening purposes.  Results should be interpreted along with   the clinical picture.  Confirmation testing is available if warranted by   ordering [id number], HCG Quantitative Pregnancy.                  Clinical Quality Measure: Blood Pressure Screening     Your blood pressure was checked while you were in the emergency department today. The last reading we obtained was  BP: 127/84 . Please read the guidelines below about what these numbers mean and what you should do about them.  If your systolic blood pressure (the top number) is less than 120 and your diastolic blood pressure (the bottom number) is less than 80, then your blood pressure is normal. There is nothing more that you need to do about it.  If your systolic blood pressure (the top number) is 120-139 or your diastolic blood pressure (the bottom number) is 80-89, your blood pressure may be higher than it should be. You should have your blood pressure rechecked within a year by a primary care provider.  If your systolic blood pressure (the top number) is 140 or greater or your diastolic blood pressure (the bottom number) is 90 or greater, you may have  "high blood pressure. High blood pressure is treatable, but if left untreated over time it can put you at risk for heart attack, stroke, or kidney failure. You should have your blood pressure rechecked by a primary care provider within the next 4 weeks.  If your provider in the emergency department today gave you specific instructions to follow-up with your doctor or provider even sooner than that, you should follow that instruction and not wait for up to 4 weeks for your follow-up visit.        Thank you for choosing [facility]       Thank you for choosing [facility] for your care. Our goal is always to provide you with excellent care. Hearing back from our patients is one way we can continue to improve our services. Please take a few minutes to complete the written survey that you may receive in the mail after you visit with us. Thank you!        [employer] Information     [employer] lets you send messages to your doctor, view your test results, renew your prescriptions, schedule appointments and more. To sign up, go to [email or web address] . Click on \"Log in\" on the left side of the screen, which will take you to the Welcome page. Then click on \"Sign up Now\" on the right side of the page.     You will be asked to enter the access code listed below, as well as some personal information. Please follow the directions to create your username and password.     Your access code is: [email or web address]  Expires: 2018  8:14 PM     Your access code will  in 90 days. If you need help or a new code, please call your [facility] clinic or [phone].        Care EveryWhere ID     This is your Care EveryWhere ID. This could be used by other organizations to access your [facility] medical records  [id number]        Equal Access to Services     TASHA KELLY : mallorie Cervantes qaybta kaalmada adeegyada, waxay idiin hayaan adeeg kharash la'aan ah. So [facility] [phone].    ATENCIÓN: Si audrey huffman " a zuñiga disposición servicios gratuitos de asistencia lingüística. Lljarad al [phone].    We comply with applicable federal civil rights laws and [place] laws. We do not discriminate on the basis of race, color, national origin, age, disability, sex, sexual orientation, or gender identity.            After Visit Summary       This is your record. Keep this with you and show to your community pharmacist(s) and doctor(s) at your next visit.

## 2018-06-27 NOTE — DISCHARGE INSTRUCTIONS
"  Alcohol Intoxication  Alcohol intoxication occurs when you drink alcohol faster than your liver can remove it from your system. The following facts are important to remember:    It can take 10 minutes or more to start to feel the effects of a drink, so you can easily get more intoxicated than you intended.    One drink may be more than 1 serving of alcohol. Depending on the drink, it can be 2 to 4 servings.    It takes about an hour for your body to metabolize (clear) 1 serving. If you have more than 1 drink, it can take a couple of hours or more.    Many things affect how drinks will affect you, including whether you ve eaten, how fast you drink, your size, how much you normally drink (or not), medicines you take, chronic diseases you have, and gender.  Signs and symptoms of alcohol poisoning  The following are signs and symptoms of alcohol poisoning:  Mild impairment    Reduced inhibitions    Slurred speech    Drowsiness    Decreased fine motor skills  Moderate impairment    Erratic behavior, aggression, depression    Impaired judgment    Confusion    Concentration difficulties    Coordination problems  Severe impairment    Vomiting    Seizures    Unconsciousness    Cold, clammy    Slow or irregular breathing    Hypothermia (low body temperature)    Coma  Health effects  Alcohol abuse causes health problems. Sometimes this can happen after only drinking a  little.\" There is no set number of drinks or amount of alcohol that defines too much. The more you drink at one time, and the more frequently you drink determine both the short-term and long-term health effects. It affects all parts of your body and your health, including your:    Brain. Alcohol is a central nervous system depressant. It can damage parts of the brain that affect your balance, memory, thinking, and emotions. It can cause memory loss, blackouts, depression, agitation, sleep cycle changes, and seizures. These changes may or may not be " reversible.    Heart and vascular system. Alcohol affects multiple areas. It can damage heart muscle causing cardiomyopathy, which is a weakening and stretching of the heart muscle. This can lead to trouble breathing, an irregular heartbeat, atrial fibrillation, leg swelling, and heart failure. It makes the blood vessels stiffen causing hypertension (high blood pressure). All of these problems increase your risk of having heart attacks or strokes.    Liver. Alcohol causes fat to build up in the liver, affecting its normal function. This increases the risk for hepatitis, leading to abdominal pain, appetite loss, jaundice, bleeding problems, liver fibrosis, and cirrhosis. This in turn can affect your ability to fight off infections, and can cause diabetes. The liver changes prevent it from removing toxins in your blood that can cause encephalopathy. Signs of this are confusion, altered level of consciousness, personality changes, memory loss, seizures, coma, and death.    Pancreas. Alcohol can cause inflammation of the pancreas, or pancreatitis. This can cause pain in your abdomen, fever, and diabetes.    Immune system. Alcohol weakens your immune system in a number of ways. It suppresses your immune system making it harder to fight off infections and colds. You will also have a higher risk of certain infections like pneumonia and tuberculosis.    Cancer risk. Alcohol raises your risk of cancer of the mouth, esophagus, pharynx, larynx, liver, and breast.    Sexual function. Alcohol abuse can also lead to sexual problems.  Alcohol use during pregnancy may cause permanent damage to the growing baby.  Home care  The following guidelines will help you care for yourself at home:    Don't drink any more alcohol.    Don't drive until all effects of the alcohol have worn off.    Don't operate machinery that can cause injuries.    Get lots of rest over the next few days. Drink plenty of water and other non-alcoholic liquids.  Try to eat regular meals.    If you have been drinking heavily on a daily basis, you may go through alcohol withdrawal. The usual symptoms last 3 to 4 days and may include nervousness, shakiness, nausea, sweating, sleeplessness, and can even cause seizures and a serious withdrawal symptom called delirium tremens, or DTs. During this time, it is best that you stay with family or friends who can help and support you. You can also admit yourself to a residential detox program. If your symptoms are severe (seizures, severe shakiness, confusion), contact your doctor or call an ambulance for help (see below).   Follow-up care  If alcohol is a problem in your life, these are some organizations that can help you:    Alcoholics Anonymous offers support through a self-help fellowship. There are no dues or fees. See the Yellow Pages and call for time and place of meetings. Find AA online at www.aa.org.    [employer] offers support to families of alcohol users. Contact 182-903-5262, or online at www.al-anomagda.org.    National [place] on Alcoholism and Drug Dependence can be reached at 072-349-6478, or online at www.ncadd.org.    There are also inpatient and residential alcohol detox programs. Check the Internet or phonebook Yellow Pages under  Drug Abuse and Treatment Centers.   Call 911  Call 911 if any of these occur:    Trouble breathing or slow irregular breathing    Chest pain    Sudden weakness on one side of your body or sudden trouble speaking    Heavy bleeding or vomiting blood    Very drowsy or trouble awakening    Fainting or loss of consciousness    Rapid heart rate    Seizure  When to seek medical advice  Call your healthcare provider right away if any of these occur:    Severe shakiness     Fever of 100.4 F (38 C) or higher, or as directed by your healthcare provider    Confusion or hallucinations (seeing, hearing, or feeling things that are not there)    Pain in your upper abdomen that gets worse    Repeated  vomiting  Date Last Reviewed: 6/1/2016 2000-2017 [employer]. [address] [facility], [place] [address]. All rights reserved. This information is not intended as a substitute for professional medical care. Always follow your healthcare professional's instructions.        Understanding Alcoholism  Alcoholism is a disease in which a person is dependent on a drug--alcohol. The disease can harm the person s physical and mental health. It can also affect his or her behavior. Alcoholism is not a character flaw. It is not a moral failure. It is a disease that worsens over time. Untreated, it can lead to brain damage and death. Recovery is possible if drinking is stopped.  Effects of alcoholism  Behavioral effects  Drinking is the main behavior of people with alcoholism. They develop a private relationship with drinking. They guard it. They give it their time, money, and attention. This may happen at the expense of family and friends. They lie for it and think about it all the time. They may risk losing their families for it. They may risk their lives for it. Despite the harm it causes, they can t control the drinking.  Health risks  People with alcoholism are at high risk for health problems. These include heart disease and cancer. They also include mental illness. People with the disease may not heal from illness normally. Unless drinking is stopped, it can cause death. Death may result from organ failure, cancer, or common viruses. Death may also result from accidents or suicide.  Physical effects  Alcohol can be like a poison to the body. It kills cells. Heavy drinking over a long time can greatly harm the body s organs. These can include the brain, heart, liver, and pancreas. Chronic drinking also harms the digestive tract. It harms the immune system as well. This leaves the body vulnerable to serious disease.  Psychological effects  Alcoholism can lead to a type of distorted thinking known as   alcohol-think.  One of the most common forms of this is denial. This is when the person denies that drinking is a problem. He or she may deny that any of the problems in his or her life are caused by drinking.  Date Last Reviewed: 6/1/2016 2000-2017 [employer]. [address] [place] [address]. All rights reserved. This information is not intended as a substitute for professional medical care. Always follow your healthcare professional's instructions.          Alcoholism: Resources for Family and Friends  Are you affected by a loved one s drinking? This sheet tells you about resources to help you cope with this problem.    Al-kristy and weiteen  Al-Anon is a self-help program. It is for adult family and friends of people with alcoholism. Alateen has the same support for teenagers. These are no-cost support groups. The program is based on Alcoholics Anonymous (AA). To find a meeting, you can contact them online or by phone at:    www.al-anon.alateen.org    828.908.7831  Adult children of alcoholics (JENNIFER)  This group helps people who grew up in a home with a parent or primary caregiver with alcoholism. It can help you get past harmful thoughts and behaviors that are the result of this. To find a meeting, you can contact them online or by phone at:    www.AdultChildren.org    346.286.7727  Other resources  There are many resources to help you cope. These include:    Professional care facilities and providers    Self-help groups     Referral services    You can also seek the help of a professional who works with families. You may learn about ways to help a family member who refuses treatment.   To find these and other sources of help, try the websites below.   Helpful websites    National [place] on Alcohol Abuse and Addiction    www.niaaa.nih.gov/alcohol-health    National [place] on Alcoholism and Drug Dependence, Inc. (NCADD)    www.ncadd.org    [employer] pak  Alateenwww.al-kristy.alateen.org/S17web.html     Alcoholics Anonymouswww.aa.org   Date Last Reviewed: 2/1/2017 2000-2017 [employer]. [address] [place] [address], [place] [address]. All rights reserved. This information is not intended as a substitute for professional medical care. Always follow your healthcare professional's instructions.        Alcoholism: How to Be Part of the Solution  Family members can play a part in helping a loved one stop drinking. It may mean changing some of their own behavior. This can help their family member with recovery. Read below to learn more.    Being part of the problem  Enabling  People with alcoholism often need the help of others to keep drinking. They may need people to make excuses for them. They may need people to rescue them and take care of them. Giving this kind of help to a person with alcoholism is known as  enabling.  Family members may do this without even knowing it.  Letting it harm your own life  When a family member tries to care for a person with alcoholism, it can be hard to live a normal life of his or her own. This can be bad for his or her own mental health. This can also harm the lives of children in the family.  Getting nowhere  As long as people stick to the behaviors that allow alcoholism to control their lives, they are on a treadmill that never stops. To get off this treadmill, family members have to stop being part of the problem and start being part of the solution.   Being part of the solution  Don t enable  Family members need to end the support that enables the person with alcoholism to drink. A good way to start is by letting the person know and understand the effects of his or her drinking. This can help him or her face reality.  Focus on your own needs  Family members can stop letting their thoughts and actions be ruled by concerns about the person with alcoholism. They can then be free from that person s illness. By taking care of  their own needs first, they can make progress toward a more normal life.  Get help  A family member cannot help someone overcome his or her alcoholism alone. There is help for those who can accept it. Learning how to deal with the disease may mean new ways of thinking. It may mean learning new behavior. Family members can be greatly helped by the guidance and support of others.  Date Last Reviewed: 6/1/2016 2000-2017 [employer]. [address] [place] [address], [place] [address]. All rights reserved. This information is not intended as a substitute for professional medical care. Always follow your healthcare professional's instructions.

## 2018-06-27 NOTE — ED PROVIDER NOTES
History     Chief Complaint:    Alcohol Intoxication (Last drink at 0900.)      YANDEL Mae is a 33 year old female with a history of chemical dependency and depression who presents to the ED for evaluation of alcohol intoxication. The patient has been seen in the ED for alcohol intoxication 3 times in the past 3 months, 3/22/18, 4/27/18, and 6/21/18 and several times in the years prior as well. According to the patient, she is currently in an outpatient treatment program on Mondays, Wednesdays, and Fridays at [facility] but states that she skipped last Friday and had been drinking for the last week because of her brother's wedding. The patient's neighbor reports that the patient was drinking heavily last night and her father was trying to bring her to treatment which she refused. He ended up bringing her home and she ended up staying on her neighbor's couch. Her neighbor states that she has access to her house because she occasionally watches her dog but was not expecting the patient last night. She reports that she let the patient sleep in her bed overnight. The patient then woke up and went home. The patient's neighbor then went over to her house to check on her and was able to convince the patient to present to the ED. The patient's neighbor reports that she needed to help the patient ambulate because she was intoxicated. Upon examination, the patient denies any suicidal thoughts or self injury and has no other complaints at this time.     Allergies:  Gluten     Medications:    The patient is currently on no regular medications.     Past Medical History:    Bulimia  Chemical dependency   Depressive disorder  IUD    Past Surgical History:    Orthopedic surgery    Family History:    No past pertinent family history.    Social History:  Negative for tobacco use.  Positive for alcohol use.   Marital Status:  Single [1]     Review of Systems   Unable to perform ROS: Mental status change  "  Psychiatric/Behavioral: Negative for self-injury and suicidal ideas.         Physical Exam   First Vitals:  BP: 127/84  Pulse: 99  Temp: 98.2  F (36.8  C)  Resp: 16  Height: 177.8 cm (5' 10\")  Weight: 68 kg (150 lb)  SpO2: 96 %      Physical Exam  GENERAL: well developed, pleasant. Slurring speech  HEAD: atraumatic  EYES: pupils reactive, extraocular muscles intact, conjunctivae normal  ENT:  mucus membranes moist  NECK:  trachea midline, normal range of motion  RESPIRATORY: no tachypnea, breath sounds clear to auscultation   CVS: normal S1/S2, no murmurs, intact distal pulses  ABDOMEN: soft, nontender, nondistention  MUSCULOSKELETAL: no deformities  SKIN: warm and dry, no acute rashes or ulceration  NEURO: GCS 15, cranial nerves intact, alert and oriented x3  PSYCH:  Mood/affect normal        Emergency Department Course   Laboratory:  HCQ qualitative: Negative  EtOH: 0.338 (A)    Interventions:  Medications - No data to display         Emergency Department Course:  Nursing notes and vitals reviewed. (4203) I performed an exam of the patient as documented above.     (1530) I rechecked the patient and discussed the results of her workup thus far.     Findings and plan explained to the Patient. Patient discharged home with instructions regarding supportive care, medications, and reasons to return. The importance of close follow-up was reviewed.     I personally reviewed the laboratory results with the Patient and answered all related questions prior to discharge.     ED Course       Impression & Plan    Medical Decision Making:  Shala Mae presents with intoxication. She's been through multiple treatments and has been in the ER. Family and friends are looking to get her into a detox facility. Unfortunately, there are no detox facilities available and [place] detox is not an option as patient has been non compliant. Patient has also been set up with treatment options with her mom and has been non compliant. " Patient here is fairly adament that she is not suicidal. Girlfriend is here and notes that she feels unsafe at home in this condition as she in the past has fallen down a flight of stairs, left the stove running and burnt some pasta, urinated on herself and in her bed, and shown to be difficult to ambulate. Here, she clinically looks intoxicated. She is still able to walk and talk at a 0.338 MISTY. Currently awaiting for her to sober up and checking other detox availabilities. Patient is refusing detox.   Critical Care time:  none    Diagnosis:    ICD-10-CM    1. Acute alcoholic intoxication in alcoholism without complication (H) F10.220        Disposition:  discharged to home    Discharge Medications:  New Prescriptions    No medications on file     Scribe Disclosure:  ISuman, am serving as a scribe on 6/27/2018  to personally document services performed by Mario Owen MD based on my observations and the provider's statements to me.          Suman Staples  6/27/2018    EMERGENCY DEPARTMENT       Mario Owen MD  06/29/18 0619

## 2018-06-27 NOTE — ED NOTES
"Patient declines detox at this time. Per MD plan of care is for patient to continue to sober up here in the ER. Family and friends have expressed concern about patient going home, they would like her to go to a rehab bed. Explained to patients friend, \" Isa\"   "

## 2018-06-27 NOTE — ED AVS SNAPSHOT
Emergency Department    [address] [place] [id number]    Phone:  [phone]    Fax:  [phone]                                       Shala Mae   MRN: [id number]    Department:   Emergency Department   Date of Visit:  6/27/2018           After Visit Summary Signature Page     I have received my discharge instructions, and my questions have been answered. I have discussed any challenges I see with this plan with the nurse or doctor.    ..........................................................................................................................................  Patient/Patient Representative Signature      ..........................................................................................................................................  Patient Representative Print Name and Relationship to Patient    ..................................................               ................................................  Date                                            Time    ..........................................................................................................................................  Reviewed by Signature/Title    ...................................................              ..............................................  Date                                                            Time

## 2024-02-09 ENCOUNTER — HOSPITAL ENCOUNTER (EMERGENCY)
Facility: CLINIC | Age: 39
Discharge: HOME | End: 2024-02-10
Attending: EMERGENCY MEDICINE | Admitting: EMERGENCY MEDICINE
Payer: MEDICAID

## 2024-02-09 DIAGNOSIS — F10.920 ALCOHOLIC INTOXICATION WITHOUT COMPLICATION (H): ICD-10-CM

## 2024-02-09 LAB
ALBUMIN SERPL BCG-MCNC: 4.8 G/DL (ref 3.5–5.2)
ALP SERPL-CCNC: 44 U/L (ref 40–150)
ALT SERPL W P-5'-P-CCNC: 19 U/L (ref 0–50)
ANION GAP SERPL CALCULATED.3IONS-SCNC: 11 MMOL/L (ref 7–15)
AST SERPL W P-5'-P-CCNC: 24 U/L (ref 0–45)
BASOPHILS # BLD AUTO: 0.1 10E3/UL (ref 0–0.2)
BASOPHILS NFR BLD AUTO: 1 %
BILIRUB SERPL-MCNC: 0.2 MG/DL
BUN SERPL-MCNC: 9.2 MG/DL (ref 6–20)
CALCIUM SERPL-MCNC: 9.2 MG/DL (ref 8.6–10)
CHLORIDE SERPL-SCNC: 102 MMOL/L (ref 98–107)
CREAT SERPL-MCNC: 0.86 MG/DL (ref 0.51–0.95)
DEPRECATED HCO3 PLAS-SCNC: 27 MMOL/L (ref 22–29)
EGFRCR SERPLBLD CKD-EPI 2021: 87 ML/MIN/1.73M2
EOSINOPHIL # BLD AUTO: 0.1 10E3/UL (ref 0–0.7)
EOSINOPHIL NFR BLD AUTO: 1 %
ERYTHROCYTE [DISTWIDTH] IN BLOOD BY AUTOMATED COUNT: 12.7 % (ref 10–15)
ETHANOL SERPL-MCNC: 0.34 G/DL
GLUCOSE SERPL-MCNC: 98 MG/DL (ref 70–99)
HCT VFR BLD AUTO: 43.6 % (ref 35–47)
HGB BLD-MCNC: 14.8 G/DL (ref 11.7–15.7)
HOLD SPECIMEN: NORMAL
HOLD SPECIMEN: NORMAL
IMM GRANULOCYTES # BLD: 0 10E3/UL
IMM GRANULOCYTES NFR BLD: 0 %
LYMPHOCYTES # BLD AUTO: 3.2 10E3/UL (ref 0.8–5.3)
LYMPHOCYTES NFR BLD AUTO: 34 %
MCH RBC QN AUTO: 31.4 PG (ref 26.5–33)
MCHC RBC AUTO-ENTMCNC: 33.9 G/DL (ref 31.5–36.5)
MCV RBC AUTO: 93 FL (ref 78–100)
MONOCYTES # BLD AUTO: 0.3 10E3/UL (ref 0–1.3)
MONOCYTES NFR BLD AUTO: 3 %
NEUTROPHILS # BLD AUTO: 5.6 10E3/UL (ref 1.6–8.3)
NEUTROPHILS NFR BLD AUTO: 61 %
NRBC # BLD AUTO: 0 10E3/UL
NRBC BLD AUTO-RTO: 0 /100
PLATELET # BLD AUTO: 330 10E3/UL (ref 150–450)
POTASSIUM SERPL-SCNC: 4.2 MMOL/L (ref 3.4–5.3)
PROT SERPL-MCNC: 7.8 G/DL (ref 6.4–8.3)
RBC # BLD AUTO: 4.71 10E6/UL (ref 3.8–5.2)
SODIUM SERPL-SCNC: 140 MMOL/L (ref 135–145)
WBC # BLD AUTO: 9.3 10E3/UL (ref 4–11)

## 2024-02-09 PROCEDURE — 80053 COMPREHEN METABOLIC PANEL: CPT | Performed by: EMERGENCY MEDICINE

## 2024-02-09 PROCEDURE — 85025 COMPLETE CBC W/AUTO DIFF WBC: CPT | Performed by: EMERGENCY MEDICINE

## 2024-02-09 PROCEDURE — 99283 EMERGENCY DEPT VISIT LOW MDM: CPT

## 2024-02-09 PROCEDURE — 82077 ASSAY SPEC XCP UR&BREATH IA: CPT | Performed by: EMERGENCY MEDICINE

## 2024-02-09 PROCEDURE — 36415 COLL VENOUS BLD VENIPUNCTURE: CPT | Performed by: EMERGENCY MEDICINE

## 2024-02-09 ASSESSMENT — ACTIVITIES OF DAILY LIVING (ADL): ADLS_ACUITY_SCORE: 35

## 2024-02-10 ENCOUNTER — HOSPITAL ENCOUNTER (EMERGENCY)
Facility: CLINIC | Age: 39
Discharge: HOME | End: 2024-02-11
Attending: EMERGENCY MEDICINE | Admitting: EMERGENCY MEDICINE
Payer: MEDICAID

## 2024-02-10 VITALS
HEART RATE: 97 BPM | SYSTOLIC BLOOD PRESSURE: 118 MMHG | BODY MASS INDEX: 22.9 KG/M2 | TEMPERATURE: 97.8 F | DIASTOLIC BLOOD PRESSURE: 81 MMHG | HEIGHT: 70 IN | RESPIRATION RATE: 16 BRPM | OXYGEN SATURATION: 95 % | WEIGHT: 160 LBS

## 2024-02-10 DIAGNOSIS — F10.920 ALCOHOLIC INTOXICATION WITHOUT COMPLICATION (H): ICD-10-CM

## 2024-02-10 PROCEDURE — 250N000013 HC RX MED GY IP 250 OP 250 PS 637: Performed by: EMERGENCY MEDICINE

## 2024-02-10 PROCEDURE — 99283 EMERGENCY DEPT VISIT LOW MDM: CPT

## 2024-02-10 RX ORDER — LAMOTRIGINE 150 MG/1
150 TABLET ORAL ONCE
Status: DISCONTINUED | OUTPATIENT
Start: 2024-02-10 | End: 2024-02-10

## 2024-02-10 RX ORDER — LAMOTRIGINE 25 MG/1
25 TABLET ORAL ONCE
Status: COMPLETED | OUTPATIENT
Start: 2024-02-10 | End: 2024-02-10

## 2024-02-10 RX ADMIN — MELATONIN 5 MG TABLET 5 MG: at 22:56

## 2024-02-10 RX ADMIN — LAMOTRIGINE 25 MG: 25 TABLET ORAL at 22:57

## 2024-02-10 ASSESSMENT — ACTIVITIES OF DAILY LIVING (ADL)
ADLS_ACUITY_SCORE: 35

## 2024-02-10 NOTE — ED PROVIDER NOTES
"  History     Chief Complaint:  Alcohol Intoxication       HPI   Shala Mae is a 38 year old female with history of alcohol abuse who presents to the emergency department with alcohol intoxication.  She is drinking heavily over the past at least 2 days.  Unsure of exact stressor that caused her to initiate drinking.  She is here with her ex significant other.  She denies any thoughts of self-harm or harm to others.  Denies wanting any help with her alcohol use.      Independent Historian:   Friend - They report as above      Medications:    No current outpatient medications on file.      Past Medical History:    Past Medical History:   Diagnosis Date    Bulimia (H28)     Chemical dependency (H)     Depression     Depressive disorder     IUD (intrauterine device) in place 4/7/2011       Past Surgical History:    Past Surgical History:   Procedure Laterality Date    ORTHOPEDIC SURGERY          Physical Exam   Patient Vitals for the past 24 hrs:   BP Temp Temp src Pulse Resp SpO2 Height Weight   02/10/24 0301 -- -- -- -- -- 95 % -- --   02/10/24 0210 -- -- -- -- -- 95 % -- --   02/10/24 0137 -- -- -- -- -- 96 % -- --   02/10/24 0136 118/81 -- -- 97 -- -- -- --   02/09/24 2225 107/70 97.8  F (36.6  C) Temporal 115 16 99 % 1.778 m (5' 10\") 72.6 kg (160 lb)        Physical Exam  Eyes:  The pupils are equal and round    Conjunctivae and sclerae are normal  ENT:    The nose is normal    Pinnae are normal    The oropharynx is normal  Neck:  Normal range of motion    There is no rigidity noted  CV:  Regular rate and rhythm     No edema  Resp:  Lungs are clear    Non-labored    No rales    No wheezing   GI:  Abdomen is soft and non-tender, there is no rigidity    No distension    No rebound tenderness   MS:  Normal muscular tone    No asymmetric leg swelling  Skin:  No rash or acute skin lesions noted  Neuro:   Awake, alert.      Speech is normal and fluent.    Face is symmetric.     Moves all extremities  Psych: Denies " SI/HI    Emergency Department Course     Imaging:  No orders to display          Laboratory:  Labs Ordered and Resulted from Time of ED Arrival to Time of ED Departure   ETHYL ALCOHOL LEVEL - Abnormal       Result Value    Alcohol ethyl 0.34 (*)    COMPREHENSIVE METABOLIC PANEL - Normal    Sodium 140      Potassium 4.2      Carbon Dioxide (CO2) 27      Anion Gap 11      Urea Nitrogen 9.2      Creatinine 0.86      GFR Estimate 87      Calcium 9.2      Chloride 102      Glucose 98      Alkaline Phosphatase 44      AST 24      ALT 19      Protein Total 7.8      Albumin 4.8      Bilirubin Total 0.2     CBC WITH PLATELETS AND DIFFERENTIAL    WBC Count 9.3      RBC Count 4.71      Hemoglobin 14.8      Hematocrit 43.6      MCV 93      MCH 31.4      MCHC 33.9      RDW 12.7      Platelet Count 330      % Neutrophils 61      % Lymphocytes 34      % Monocytes 3      % Eosinophils 1      % Basophils 1      % Immature Granulocytes 0      NRBCs per 100 WBC 0      Absolute Neutrophils 5.6      Absolute Lymphocytes 3.2      Absolute Monocytes 0.3      Absolute Eosinophils 0.1      Absolute Basophils 0.1      Absolute Immature Granulocytes 0.0      Absolute NRBCs 0.0          Emergency Department Course & Assessments:    Interventions:  Medications - No data to display     Social Determinants of Health affecting care:   None    Disposition:  The patient was discharged.     Impression & Plan      Medical Decision Making:  Shala Mae is a 38 year old female who presents the emergency department with alcohol intoxication.  She been drinking heavily over the past couple of days.  She has history of alcohol abuse.  Reported history of alcohol withdrawal seizures.  Patient here is intoxicated initially.  Laboratory workup is grossly normal aside from her elevated ethanol level.  She is allowed to sober here and reports wanting to be discharged to approximately 5 hours after her arrival.  She noted to be ambulating steadily.  She  denies any thoughts of self-harm or harm to others.  There is no signs of withdrawal currently.  She reports that she has a friend who will come and pick her up.  She was discharged.      Diagnosis:    ICD-10-CM    1. Alcoholic intoxication without complication (H24)  F10.920           Vincent Hoover MD  2/10/2024   Ruby Kelley MD [place], Vincent Cabrera MD  02/10/24 0307

## 2024-02-10 NOTE — ED TRIAGE NOTES
Pt's  arrived in front of triage, states concern for family member alcohol intoxication and withdrawal. Pt was unwilling to come in PD called Pt was noted to be unable to stand without  assist. PD placed on CRISTIAN r/t intoxication unable to care for self. Pt reports Hx of seizures when stops drinkig unable to state when last drink was.      Triage Assessment (Adult)       Row Name 02/09/24 3018          Triage Assessment    Airway WDL WDL        Respiratory WDL    Respiratory WDL WDL        Skin Circulation/Temperature WDL    Skin Circulation/Temperature WDL WDL        Cardiac WDL    Cardiac WDL WDL        Peripheral/Neurovascular WDL    Peripheral Neurovascular WDL WDL        Cognitive/Neuro/Behavioral WDL    Cognitive/Neuro/Behavioral WDL X  pt intoxicated was unable to stand on her own

## 2024-02-10 NOTE — DISCHARGE INSTRUCTIONS

## 2024-02-11 VITALS
TEMPERATURE: 98.3 F | WEIGHT: 150 LBS | RESPIRATION RATE: 16 BRPM | OXYGEN SATURATION: 94 % | SYSTOLIC BLOOD PRESSURE: 117 MMHG | HEIGHT: 70 IN | HEART RATE: 99 BPM | DIASTOLIC BLOOD PRESSURE: 77 MMHG | BODY MASS INDEX: 21.47 KG/M2

## 2024-02-11 ASSESSMENT — ACTIVITIES OF DAILY LIVING (ADL)
ADLS_ACUITY_SCORE: 35
ADLS_ACUITY_SCORE: 35

## 2024-02-11 NOTE — ED TRIAGE NOTES
Patient arrives via EMS from sober living house on a hold. Her sponsor called for her after noticing she was intoxicated. Had 6-8 mini bottles of vodka and took 2 0.5 clonazepam pills within the last 2 hours. Was seen here for intoxication yesterday. Notes she does not want to kill herself, is just sad about a break up. Her sponsor will be coming. BG 96.      Triage Assessment (Adult)       Row Name 02/10/24 0872          Triage Assessment    Airway WDL WDL        Respiratory WDL    Respiratory WDL WDL        Skin Circulation/Temperature WDL    Skin Circulation/Temperature WDL WDL        Cardiac WDL    Cardiac WDL WDL        Peripheral/Neurovascular WDL    Peripheral Neurovascular WDL WDL        Cognitive/Neuro/Behavioral WDL    Cognitive/Neuro/Behavioral WDL X;speech;level of consciousness;mood/behavior     Level of Consciousness somnolent     Arousal Level arouses to voice     Orientation oriented x 4     Speech slurred     Mood/Behavior sad        [place] Coma Scale    Best Eye Response 4-->(E4) spontaneous     Best Motor Response 6-->(M6) obeys commands     Best Verbal Response 5-->(V5) oriented     [place] Coma Scale Score 15                     
glasses

## 2024-02-11 NOTE — DISCHARGE INSTRUCTIONS

## 2024-02-11 NOTE — ED PROVIDER NOTES
"  History     Chief Complaint:  Alcohol Intoxication       The history is provided by the patient and a friend.      Shala Mae is a 38 year old female who presents for alcohol intoxication. Patient was at the ED yesterday as well for the alcohol intoxication as well. She was discharged and continued drinking into today. She was with her sponsors who called 911 after noticing the \"blank look\" on her face. Patient says she mixed alcohol with her prescribed clonazepam earlier today. Sponsor said patient was afraid she was going to have an alcoholic seizure which she has had in the past. She is on Lamictal but says she does not take it as regularly as she is supposed to. She notes feeling \"angry with everything\". She denies vomiting or falls.     Independent Historian:   Sponsors in the room who reports some of the history. See above.     Review of External Notes:   Reviewed emergency department visit from yesterday where she was seen for similar presentation    Medications:    Adderall  Buspar  Clonazepam  Lamictal    Past Medical History:    Alcohol intoxication delirium  Alcohol abuse  Bulimia   Depression  Stimulant use disorder  MICHELLE  Elevated liver enzymes  Acrocyanosis  Bradycardia  Bipolar disorder    Past Surgical History:    Breast augmentation  Ankle arthroscopy    Physical Exam   Patient Vitals for the past 24 hrs:   BP Temp Temp src Pulse Resp SpO2 Height Weight   02/10/24 2149 121/78 98.3  F (36.8  C) Oral 99 18 98 % 1.778 m (5' 10\") 68 kg (150 lb)      Physical Exam  General: Sitting up in bed  Eyes:  The pupils are equal and round    Conjunctivae and sclerae are normal  ENT:    Atraumatic face  Neck:  Normal range of motion  CV:  Regular rate    Skin warm and well perfused   Resp:  Non labored breathing on room air    No tachypnea    No cough heard  MS:  Normal muscular tone  Skin:  No rash or acute skin lesions noted  Neuro:   Awake, alert.  Appears mildly intoxicated    Speech is normal and " fluent.    Face is symmetric.     Moves all extremities equally  Psych: Normal affect.  Appropriate interactions.    Emergency Department Course   Emergency Department Course & Assessments:  Interventions:  Medications   melatonin tablet 5 mg (5 mg Oral $Given 2/10/24 5133)   lamoTRIgine (LaMICtal) tablet 25 mg (25 mg Oral $Given 2/10/24 9107)      Assessments:  2211 I obtained history and examined the patient as noted above.    Social Determinants of Health affecting care:   None    Disposition:  The patient was discharged.     Impression & Plan      Medical Decision Making:  Shala Mae is a 38-year-old female present emergency department with alcohol intoxication.  Patient in the emergency department last night and had basic labs done.  I do not think labs need to be repeated today.  She has continued to drink today as well as took her clonazepam this evening to help with anxiety.  This was not a suicide attempt.  She declines detox.  At this time she appears mildly intoxicated.  Patient slept in the emergency department for several hours and continues to decline detox on recheck. Walking with steady gait, clear speech and no signs of withdrawal on discharge.  At this point I do not think patient requires hospitalization. And patient wants to leave so do not think I can hold her here in ED any longer.    Diagnosis:    ICD-10-CM    1. Alcoholic intoxication without complication (H24)  F10.920         Scribe Disclosure:  I, Annia Mullins, am serving as a scribe at 11:28 PM on 2/10/2024 to document services personally performed by Deanna Sanchez MD based on my observations and the provider's statements to me.    2/10/2024   Deanna Sanchez MD Goertz, Maria Kristine, MD  02/11/24 4996

## 2024-02-18 ENCOUNTER — HOSPITAL ENCOUNTER (EMERGENCY)
Facility: CLINIC | Age: 39
Discharge: HOME | End: 2024-02-18
Attending: EMERGENCY MEDICINE | Admitting: EMERGENCY MEDICINE
Payer: MEDICAID

## 2024-02-18 VITALS
SYSTOLIC BLOOD PRESSURE: 129 MMHG | OXYGEN SATURATION: 99 % | HEART RATE: 105 BPM | TEMPERATURE: 98.4 F | RESPIRATION RATE: 9 BRPM | DIASTOLIC BLOOD PRESSURE: 91 MMHG

## 2024-02-18 DIAGNOSIS — K70.10 ALCOHOLIC HEPATITIS WITHOUT ASCITES (H): ICD-10-CM

## 2024-02-18 DIAGNOSIS — F10.920 ALCOHOLIC INTOXICATION WITHOUT COMPLICATION (H): ICD-10-CM

## 2024-02-18 LAB
ALBUMIN SERPL BCG-MCNC: 4.3 G/DL (ref 3.5–5.2)
ALP SERPL-CCNC: 45 U/L (ref 40–150)
ALT SERPL W P-5'-P-CCNC: 63 U/L (ref 0–50)
ANION GAP SERPL CALCULATED.3IONS-SCNC: 18 MMOL/L (ref 7–15)
AST SERPL W P-5'-P-CCNC: 110 U/L (ref 0–45)
BASOPHILS # BLD AUTO: 0 10E3/UL (ref 0–0.2)
BASOPHILS NFR BLD AUTO: 1 %
BILIRUB SERPL-MCNC: 0.5 MG/DL
BUN SERPL-MCNC: 16.3 MG/DL (ref 6–20)
CALCIUM SERPL-MCNC: 8.6 MG/DL (ref 8.6–10)
CHLORIDE SERPL-SCNC: 99 MMOL/L (ref 98–107)
CREAT SERPL-MCNC: 0.7 MG/DL (ref 0.51–0.95)
DEPRECATED HCO3 PLAS-SCNC: 23 MMOL/L (ref 22–29)
EGFRCR SERPLBLD CKD-EPI 2021: >90 ML/MIN/1.73M2
EOSINOPHIL # BLD AUTO: 0 10E3/UL (ref 0–0.7)
EOSINOPHIL NFR BLD AUTO: 1 %
ERYTHROCYTE [DISTWIDTH] IN BLOOD BY AUTOMATED COUNT: 13.5 % (ref 10–15)
ETHANOL SERPL-MCNC: 0.41 G/DL
GLUCOSE SERPL-MCNC: 151 MG/DL (ref 70–99)
HCT VFR BLD AUTO: 39.4 % (ref 35–47)
HGB BLD-MCNC: 13.8 G/DL (ref 11.7–15.7)
HOLD SPECIMEN: NORMAL
IMM GRANULOCYTES # BLD: 0 10E3/UL
IMM GRANULOCYTES NFR BLD: 0 %
LIPASE SERPL-CCNC: 112 U/L (ref 13–60)
LYMPHOCYTES # BLD AUTO: 1.8 10E3/UL (ref 0.8–5.3)
LYMPHOCYTES NFR BLD AUTO: 26 %
MAGNESIUM SERPL-MCNC: 2.2 MG/DL (ref 1.7–2.3)
MCH RBC QN AUTO: 31.5 PG (ref 26.5–33)
MCHC RBC AUTO-ENTMCNC: 35 G/DL (ref 31.5–36.5)
MCV RBC AUTO: 90 FL (ref 78–100)
MONOCYTES # BLD AUTO: 0.4 10E3/UL (ref 0–1.3)
MONOCYTES NFR BLD AUTO: 6 %
NEUTROPHILS # BLD AUTO: 4.4 10E3/UL (ref 1.6–8.3)
NEUTROPHILS NFR BLD AUTO: 66 %
NRBC # BLD AUTO: 0 10E3/UL
NRBC BLD AUTO-RTO: 0 /100
PHOSPHATE SERPL-MCNC: 2.9 MG/DL (ref 2.5–4.5)
PLATELET # BLD AUTO: 219 10E3/UL (ref 150–450)
POTASSIUM SERPL-SCNC: 3.9 MMOL/L (ref 3.4–5.3)
PROT SERPL-MCNC: 6.9 G/DL (ref 6.4–8.3)
RBC # BLD AUTO: 4.38 10E6/UL (ref 3.8–5.2)
SODIUM SERPL-SCNC: 140 MMOL/L (ref 135–145)
WBC # BLD AUTO: 6.7 10E3/UL (ref 4–11)

## 2024-02-18 PROCEDURE — 96360 HYDRATION IV INFUSION INIT: CPT

## 2024-02-18 PROCEDURE — 250N000011 HC RX IP 250 OP 636: Performed by: EMERGENCY MEDICINE

## 2024-02-18 PROCEDURE — 83690 ASSAY OF LIPASE: CPT | Performed by: EMERGENCY MEDICINE

## 2024-02-18 PROCEDURE — 99283 EMERGENCY DEPT VISIT LOW MDM: CPT | Mod: 25

## 2024-02-18 PROCEDURE — 83735 ASSAY OF MAGNESIUM: CPT | Performed by: EMERGENCY MEDICINE

## 2024-02-18 PROCEDURE — 84100 ASSAY OF PHOSPHORUS: CPT | Performed by: EMERGENCY MEDICINE

## 2024-02-18 PROCEDURE — 80053 COMPREHEN METABOLIC PANEL: CPT | Performed by: EMERGENCY MEDICINE

## 2024-02-18 PROCEDURE — 258N000003 HC RX IP 258 OP 636: Performed by: EMERGENCY MEDICINE

## 2024-02-18 PROCEDURE — 82077 ASSAY SPEC XCP UR&BREATH IA: CPT | Performed by: EMERGENCY MEDICINE

## 2024-02-18 PROCEDURE — 85025 COMPLETE CBC W/AUTO DIFF WBC: CPT | Performed by: EMERGENCY MEDICINE

## 2024-02-18 PROCEDURE — 250N000013 HC RX MED GY IP 250 OP 250 PS 637: Performed by: EMERGENCY MEDICINE

## 2024-02-18 PROCEDURE — 36415 COLL VENOUS BLD VENIPUNCTURE: CPT | Performed by: EMERGENCY MEDICINE

## 2024-02-18 RX ADMIN — DEXTROSE AND SODIUM CHLORIDE: 5; 450 INJECTION, SOLUTION INTRAVENOUS at 19:30

## 2024-02-18 RX ADMIN — SODIUM CHLORIDE 1000 ML: 9 INJECTION, SOLUTION INTRAVENOUS at 18:16

## 2024-02-18 ASSESSMENT — ACTIVITIES OF DAILY LIVING (ADL): ADLS_ACUITY_SCORE: 35

## 2024-02-19 NOTE — ED PROVIDER NOTES
History     Chief Complaint:  Alcohol Intoxication       HPI   Shala Mae is a 38 year old female, with a history of alcohol abuse, who presents to the ED via ambulance for evaluation of alcohol intoxication. The patient reports she has been using alcohol for the past five days in a binge fashion. She reports her sponsor was out of town for the past five days but came back approximately 2 hours prior to her visit to the emergency department. She was found by her father who is present during her visit to the emergency department today, along with her mother. The patient's father notes the patient has experienced alcohol withdrawal related seizures and tremors  at prior times when having stopped drinking. The patient reports having periods of sobriety in the past. The patient denies vomiting, other substance use, or a history of pancreatitis. The patient reports being in alcohol related treatment approximately 26 time in the past.     The patient's parents are extremely worried about their daughter and noted that they would like for her to be placed on a 72-hour hold or to potentially undergo a commitment process.  The patient is not interested in this.  The parents have appropriate concerns with her daughter but this is been a consistent problem for nearly 20 years per both the patient's report and the family's report.  The patient is surprisingly clinically sober despite high levels of alcohol intoxication demonstrating the patient's chronic severe alcohol use disorder.  Patient notes that she has been in treatment countless times.  She is not interested in inpatient detoxification or treatment at this juncture.  She is not using other illicit substances.          Independent Historian:   The patient, the patient's mother, and the patient's father as noted above in the HPI.    Review of External Notes:  None    Allergies:  Gluten Meal     Listed Medications:    Lamictal   Klonopin  Nexplanon  Paxil  Buspar    Kristan       Past Medical and Surgical History:    Bulimia   Chemical dependency   Depression   IUD in place   Bipolar disorder     Orthopedic surgery     Family History:    family history is not on file.    Social History:   reports that she has never smoked. She has never used smokeless tobacco. She reports current alcohol use. She reports that she does not use drugs.      Physical Exam   Patient Vitals for the past 24 hrs:   BP Temp Temp src Pulse Resp SpO2   02/18/24 1917 -- -- -- 105 -- 99 %   02/18/24 1902 -- -- -- 107 (!) 9 97 %   02/18/24 1858 -- -- -- 105 (!) 9 99 %   02/18/24 1847 -- -- -- 108 11 99 %   02/18/24 1845 -- -- -- 101 10 99 %   02/18/24 1832 -- -- -- 115 15 98 %   02/18/24 1828 -- -- -- 100 14 97 %   02/18/24 1808 (!) 129/91 98.4  F (36.9  C) Oral 105 18 96 %      General: The patient is resting comfortably on the gurney. The patient exhibits mildly slurred speech.   Head:  The scalp, face, and head appear normal  Eyes:  The pupils are equal, round, and reactive to light    Mild nystagmus present consistent with alcohol intoxication.     Extraocular muscles are intact    Conjunctivae and sclerae are normal  ENT:    The nose is normal    Pinnae are normal    The oropharynx is normal  Neck:  Normal range of motion    There is no rigidity noted  CV:  Regular rate  Normal underlying rhythm     Normal S1/S2    No pathological murmur detected  Resp:  Lungs are clear    There is no tachypnea    Non-labored    No rales    No wheezing   GI:  Abdomen is soft, there is no rigidity    No distension/tympani    No rebound tenderness     Non-surgical without peritoneal features at this time  MS:  Normal muscular tone    Symmetric motor strength    No major joint effusions    No asymmetric leg swelling, no calf tenderness  Skin:  No rash or acute skin lesions noted  Neuro:  Speech is normal and fluent, there is no aphasia    No motor deficits    Cranial nerves are intact  Psych: Awake. Alert.      Normal  affect.  Appropriate interactions.                Emergency Department Course   EKG:  No results found for this or any previous visit.       Reports in this section have been read by the radiologist.    Laboratory:  Labs Ordered and Resulted from Time of ED Arrival to Time of ED Departure   COMPREHENSIVE METABOLIC PANEL - Abnormal       Result Value    Sodium 140      Potassium 3.9      Carbon Dioxide (CO2) 23      Anion Gap 18 (*)     Urea Nitrogen 16.3      Creatinine 0.70      GFR Estimate >90      Calcium 8.6      Chloride 99      Glucose 151 (*)     Alkaline Phosphatase 45       (*)     ALT 63 (*)     Protein Total 6.9      Albumin 4.3      Bilirubin Total 0.5     LIPASE - Abnormal    Lipase 112 (*)    ETHYL ALCOHOL LEVEL - Abnormal    Alcohol ethyl 0.41 (*)    MAGNESIUM - Normal    Magnesium 2.2     PHOSPHORUS - Normal    Phosphorus 2.9     CBC WITH PLATELETS AND DIFFERENTIAL    WBC Count 6.7      RBC Count 4.38      Hemoglobin 13.8      Hematocrit 39.4      MCV 90      MCH 31.5      MCHC 35.0      RDW 13.5      Platelet Count 219      % Neutrophils 66      % Lymphocytes 26      % Monocytes 6      % Eosinophils 1      % Basophils 1      % Immature Granulocytes 0      NRBCs per 100 WBC 0      Absolute Neutrophils 4.4      Absolute Lymphocytes 1.8      Absolute Monocytes 0.4      Absolute Eosinophils 0.0      Absolute Basophils 0.0      Absolute Immature Granulocytes 0.0      Absolute NRBCs 0.0              Emergency Department Course & Assessments:             Interventions/ED Medications:  Medications   sodium chloride 0.9% BOLUS 1,000 mL (1,000 mLs Intravenous $New Bag 2/18/24 1816)   thiamine (B-1) tablet 100 mg (100 mg Oral Not Given 2/18/24 1932)   dextrose 5% and 0.45% NaCl infusion (0 mLs Intravenous Stopped 2/18/24 1946)          Independent Interpretation of Radiology Studies by Dr. Garrido      Assessments/Consultations/Discussion of Management:  1850      I obtained history and examined the  patient as noted above.  2005      I reevaluated the patient, and spoke to her and her family members regarding plan of care.        Disposition:  The patient was discharged to home.       Impression & Plan        Medical Decision Making:  This patient presents to the emergency department with a history of alcohol use disorder/alcoholism.  The patient has been on a recent binge for at least 5 days.  She does have a history of alcohol withdrawal and alcohol withdrawal seizures remotely.  Patient's alcohol level here ranges anywhere from 0.3-0.4 and the patient is answering questions appropriately, has no complaints, and is able to eat and ambulate in the department despite these significant levels of alcohol in the system.  The patient is able to note that she does not want inpatient detoxification services nor does she want to start a commitment process.  She and her family clearly have disagreements.  The patient is not able to be placed on a 72-hour hold at this time as despite the alcohol intoxication she does understand the ramifications of her decision.  She has reasonable decisional capacity.  The patient's labs were checked and she does have some mild alcohol induced hepatitis which is not surprising.  There is no evidence of clinical pancreatitis.  The patient does have money, a telephone, she knows how to access Uber and lift, and she plans to get a ride and she does have places that she can go.  The parents left prior to the final disposition.  We did check on the status of an inpatient detox bed and none are available in the [facility] area, the patient did not want one anyway.        Diagnosis:    ICD-10-CM    1. Alcoholic intoxication without complication (H24)  F10.920       2. Alcoholic hepatitis without ascites (H28)  K70.10            Scribe Disclosure:  VICENTE, Price Freire, am serving as a scribe at 8:07 PM on 2/18/2024 to document services personally performed by Eric Garrido MD based on my  observations and the provider's statements to me.   2/18/2024   Eric Garrido MD Rock, Michael P, MD  02/18/24 2035

## 2024-02-19 NOTE — ED NOTES
[place] intake called about inpt detox, no female beds available, informed Dr Garrido of this and of pt wanting to go, pt father currently here and is wanting to leave, pt father informed that pt wants to go.    Pat informed me she helps take care of patient. She informed me that the patient was confused about the conversation I had with her earlier. Pat believes patient is still taking this. She will call rheumatologist. No questions at this time.

## 2024-02-19 NOTE — ED NOTES
Pt parents in the room with her, father put call light on, states pt is having a seizure, pt in laying in bed with arm across her head and moving all extremeties voluntarily, no stiff postured movement observed, talked with pt and pt verbally responded instantly, pt crying, states she feels like she needs medicine for a seizure, pt refusing thiamine tablet, will update MD

## 2024-02-19 NOTE — ED TRIAGE NOTES
PARENTS FOUND PT INTOXICATED  at sponsors home, parents called 911, blew .37 for ems, , VSS  , has had 10 relapses since may

## 2024-02-19 NOTE — ED NOTES
"Informed Dr Garrido of critical etoh level. And that pt wants some meds \"for having a seizure\" per pt, no seizure activity noted  "

## 2024-03-26 ENCOUNTER — APPOINTMENT (OUTPATIENT)
Dept: ADMINISTRATIVE | Facility: CLINIC | Age: 39
End: 2024-03-26
Payer: MEDICAID

## 2024-05-27 ENCOUNTER — HOSPITAL ENCOUNTER (EMERGENCY)
Facility: CLINIC | Age: 39
Discharge: HOME | End: 2024-05-28
Attending: EMERGENCY MEDICINE | Admitting: EMERGENCY MEDICINE
Payer: COMMERCIAL

## 2024-05-27 DIAGNOSIS — F10.920 ALCOHOLIC INTOXICATION WITHOUT COMPLICATION (H): ICD-10-CM

## 2024-05-27 PROCEDURE — 99285 EMERGENCY DEPT VISIT HI MDM: CPT

## 2024-05-27 ASSESSMENT — ACTIVITIES OF DAILY LIVING (ADL)
ADLS_ACUITY_SCORE: 35
ADLS_ACUITY_SCORE: 35

## 2024-05-27 ASSESSMENT — COLUMBIA-SUICIDE SEVERITY RATING SCALE - C-SSRS
6. HAVE YOU EVER DONE ANYTHING, STARTED TO DO ANYTHING, OR PREPARED TO DO ANYTHING TO END YOUR LIFE?: NO
2. HAVE YOU ACTUALLY HAD ANY THOUGHTS OF KILLING YOURSELF IN THE PAST MONTH?: NO
1. IN THE PAST MONTH, HAVE YOU WISHED YOU WERE DEAD OR WISHED YOU COULD GO TO SLEEP AND NOT WAKE UP?: NO

## 2024-05-28 VITALS
HEART RATE: 103 BPM | RESPIRATION RATE: 16 BRPM | DIASTOLIC BLOOD PRESSURE: 77 MMHG | SYSTOLIC BLOOD PRESSURE: 122 MMHG | OXYGEN SATURATION: 95 %

## 2024-05-28 NOTE — ED PROVIDER NOTES
Seen at change of shift.  Briefly comfortable 39-year-old female with history of alcohol abuse, sober for about 5 months, started drinking in 2 days ago.  Has been drinking heavily since then.  She is currently staying with a friend.  Friend called 911 because the patient was heavily intoxicated.  Patient was placed on a hold by PD because of reported history of seizures with alcohol withdrawals.  However, patient denies this on arrival.  She denies any suicidal ideation, coingestion, though does admit to using THC.  Denies any recent falls.  Currently has no complaints.  States that she does not want to go to detox.  On exam, she appears intoxicated, without any signs or symptoms to suggest alcohol withdrawal.  Care assumed by the oncoming physician, Dr. Garrido.     Chiquita, Rose Maria MD  05/27/24 6639

## 2024-05-28 NOTE — ED NOTES
Patient told writer that she had been sober for 5 months and started drinking 2 days ago. During that time patient think she has had 0.5 L of vodka, last drink was 4 hours ago. Patient denies having withdrawal seizures in the past. Patient is interested in getting sober, but does not want to go to detox. Patient states last time she was able to detox on her own. Patient states she can go back to where she is staying.

## 2024-05-28 NOTE — ED PROVIDER NOTES
Emergency Department Note      History of Present Illness     Chief Complaint  Alcohol Intoxication    HPI  Shala Mae is a 39 year old female with a history of alcohol use disorder who reports to the ED for evaluation of alcohol intoxication. Patient reports as two days worth of alcohol binge with vodka. She has struggled with alcohol abuse for the past 10 years, but was recently sober for 5. She denies any physical symptoms. Patients sponsor called EMS for her today. Denies use of other drugs. Patient is physically active, but states poor eating. No other medical problems. Patient is not interested in treatment options at this time because she feels suited to regain control of her alcohol use.      Independent Historian  None    Review of External Notes  None  Past Medical History   Medical History and Problem List  Bulimia   Depression   Alcohol use disorder   Bipolar disorder   Eating disorder   MICHELLE   Stimulant use disorder   Bradycardia   Acrocyanosis   Anorexia   Polyarthralgia     Medications  Klonopin   Lamictal   Buspirone   Adderall   Buspar  Abilify   Zoloft   Vistaril   Zofran   Frederica     Surgical History   Past Surgical History:   Procedure Laterality Date    ORTHOPEDIC SURGERY       Physical Exam   Patient Vitals for the past 24 hrs:   BP Pulse Resp SpO2   05/27/24 2120 121/78 78 16 96 %     Physical Exam  General: Resting comfortably on the gurney.  Slightly slurred speech from alcohol intoxication.  Very pleasant.  Head:  The scalp, face, and head appear normal  Eyes:  The pupils are equal, round, and reactive to light    There is slight horizontal nystagmus from alcohol use.    Extraocular muscles are intact    Conjunctivae and sclerae are normal  ENT:    The nose is normal    Pinnae are normal    The oropharynx is normal  Neck:  Normal range of motion    There is no rigidity noted  CV:  Regular rate  Normal underlying rhythm     Normal S1/S2    No pathological murmur detected  Resp:  Lungs  are clear    There is no tachypnea    Non-labored    No rales    No wheezing   GI:  Abdomen is soft, there is no rigidity    No distension/tympani    No rebound tenderness     Non-surgical without peritoneal features at this time  MS:  Normal muscular tone    Symmetric motor strength    No major joint effusions    No asymmetric leg swelling    No calf tenderness  Skin:  No rash or acute skin lesions noted  Neuro:  Speech is normal and fluent, there is no aphasia    No motor deficits    Cranial nerves are intact  Psych: Awake. Alert.      Normal affect  Appropriate interactions         Diagnostics     ED Course    Medications Administered  Medications - No data to display    Procedures  Procedures     Discussion of Management  None    Social Determinants of Health adding to complexity of care  None    ED Course  ED Course as of 05/27/24 2353   Mon May 27, 2024   2231 I obtained history and examined the patient as noted above.      Medical Decision Making / Diagnosis   CMS Diagnoses: None    MIPS     None    MDM  Shala Mae is a 39 year old female who presents with alcohol intoxication.  She was sent in by her sponsor.  The patient does not have any complaints.  She is not interested in any acute detox treatment options as she feels like she has a good understanding of available outpatient resources.  Based on her long history of sobriety, she does likely know what it takes, once she is clinically sober she will be discharged from the emergency department.  There is no acute complaints or physical exam findings that are concerning at this time.    Disposition  The patient was discharged.     ICD-10 Codes:    ICD-10-CM    1. Alcoholic intoxication without complication (H24)  F10.920                  Scribe Disclosure:  I, Pat Gonzáles, am serving as a scribe at 10:41 PM on 5/27/2024 to document services personally performed by Eric Garrido MD based on my observations and the provider's statements to me.         Eric Garrido MD  05/28/24 0007

## 2024-05-28 NOTE — DISCHARGE INSTRUCTIONS
Please seek help with your sponsor and treatment options as you have in the past.  Let us know if there is anything that you need from us here at the emergency department.

## 2024-05-28 NOTE — ED TRIAGE NOTES
Patient BIBA from home on PD transport hold. Patient has been staying at a friends house for a few months and today her friend called PD concerned about patients drinking. PD found patient intoxicated, laying in her bed,not able to stand on her own. Friend is concerned about patient going through withdrawals .335 PBT. EMS 18 G to left AC and 500cc fluids.

## 2024-05-28 NOTE — ED NOTES
Bed: ED22  Expected date:   Expected time:   Means of arrival:   Comments:  Karely 1 39f ETOH eta 2103

## 2024-05-29 ENCOUNTER — HOSPITAL ENCOUNTER (EMERGENCY)
Facility: CLINIC | Age: 39
Discharge: HOME | End: 2024-05-29
Attending: EMERGENCY MEDICINE | Admitting: EMERGENCY MEDICINE
Payer: COMMERCIAL

## 2024-05-29 ENCOUNTER — APPOINTMENT (OUTPATIENT)
Dept: ULTRASOUND IMAGING | Facility: CLINIC | Age: 39
End: 2024-05-29
Attending: EMERGENCY MEDICINE
Payer: COMMERCIAL

## 2024-05-29 VITALS
HEIGHT: 70 IN | DIASTOLIC BLOOD PRESSURE: 85 MMHG | BODY MASS INDEX: 21.47 KG/M2 | RESPIRATION RATE: 18 BRPM | HEART RATE: 102 BPM | TEMPERATURE: 98.5 F | OXYGEN SATURATION: 95 % | WEIGHT: 150 LBS | SYSTOLIC BLOOD PRESSURE: 126 MMHG

## 2024-05-29 DIAGNOSIS — F10.921 ALCOHOL INTOXICATION WITH DELIRIUM (H): ICD-10-CM

## 2024-05-29 DIAGNOSIS — S01.511A LACERATION OF LOWER LIP, INITIAL ENCOUNTER: ICD-10-CM

## 2024-05-29 DIAGNOSIS — Z32.01 PREGNANCY TEST POSITIVE: ICD-10-CM

## 2024-05-29 PROBLEM — F10.20 ALCOHOL DEPENDENCE, CONTINUOUS DRINKING BEHAVIOR (H): Status: ACTIVE | Noted: 2024-05-29

## 2024-05-29 PROBLEM — F41.1 GAD (GENERALIZED ANXIETY DISORDER): Status: ACTIVE | Noted: 2024-05-29

## 2024-05-29 LAB
ABO/RH(D): NORMAL
ALBUMIN SERPL BCG-MCNC: 4.5 G/DL (ref 3.5–5.2)
ALP SERPL-CCNC: 63 U/L (ref 40–150)
ALT SERPL W P-5'-P-CCNC: 36 U/L (ref 0–50)
AMPHETAMINES UR QL SCN: NORMAL
ANION GAP SERPL CALCULATED.3IONS-SCNC: 17 MMOL/L (ref 7–15)
ANTIBODY SCREEN: NEGATIVE
APAP SERPL-MCNC: <5 UG/ML (ref 10–30)
AST SERPL W P-5'-P-CCNC: 64 U/L (ref 0–45)
BARBITURATES UR QL SCN: NORMAL
BASOPHILS # BLD AUTO: 0.1 10E3/UL (ref 0–0.2)
BASOPHILS NFR BLD AUTO: 1 %
BENZODIAZ UR QL SCN: NORMAL
BILIRUB SERPL-MCNC: 0.4 MG/DL
BUN SERPL-MCNC: 14.1 MG/DL (ref 6–20)
BZE UR QL SCN: NORMAL
CALCIUM SERPL-MCNC: 9.5 MG/DL (ref 8.6–10)
CANNABINOIDS UR QL SCN: NORMAL
CHLORIDE SERPL-SCNC: 103 MMOL/L (ref 98–107)
CREAT SERPL-MCNC: 0.79 MG/DL (ref 0.51–0.95)
DEPRECATED HCO3 PLAS-SCNC: 23 MMOL/L (ref 22–29)
EGFRCR SERPLBLD CKD-EPI 2021: >90 ML/MIN/1.73M2
EOSINOPHIL # BLD AUTO: 0 10E3/UL (ref 0–0.7)
EOSINOPHIL NFR BLD AUTO: 1 %
ERYTHROCYTE [DISTWIDTH] IN BLOOD BY AUTOMATED COUNT: 12.2 % (ref 10–15)
ETHANOL SERPL-MCNC: 0.33 G/DL
FENTANYL UR QL: NORMAL
GLUCOSE SERPL-MCNC: 86 MG/DL (ref 70–99)
HCG INTACT+B SERPL-ACNC: 18 MIU/ML
HCG SERPL QL: POSITIVE
HCT VFR BLD AUTO: 43.2 % (ref 35–47)
HGB BLD-MCNC: 14.6 G/DL (ref 11.7–15.7)
IMM GRANULOCYTES # BLD: 0 10E3/UL
IMM GRANULOCYTES NFR BLD: 0 %
LYMPHOCYTES # BLD AUTO: 3.1 10E3/UL (ref 0.8–5.3)
LYMPHOCYTES NFR BLD AUTO: 44 %
MAGNESIUM SERPL-MCNC: 2.4 MG/DL (ref 1.7–2.3)
MCH RBC QN AUTO: 31.4 PG (ref 26.5–33)
MCHC RBC AUTO-ENTMCNC: 33.8 G/DL (ref 31.5–36.5)
MCV RBC AUTO: 93 FL (ref 78–100)
MONOCYTES # BLD AUTO: 0.2 10E3/UL (ref 0–1.3)
MONOCYTES NFR BLD AUTO: 3 %
NEUTROPHILS # BLD AUTO: 3.7 10E3/UL (ref 1.6–8.3)
NEUTROPHILS NFR BLD AUTO: 51 %
NRBC # BLD AUTO: 0 10E3/UL
NRBC BLD AUTO-RTO: 0 /100
OPIATES UR QL SCN: NORMAL
PCP QUAL URINE (ROCHE): NORMAL
PLATELET # BLD AUTO: 313 10E3/UL (ref 150–450)
POTASSIUM SERPL-SCNC: 4.5 MMOL/L (ref 3.4–5.3)
PROT SERPL-MCNC: 7.5 G/DL (ref 6.4–8.3)
RBC # BLD AUTO: 4.65 10E6/UL (ref 3.8–5.2)
SODIUM SERPL-SCNC: 143 MMOL/L (ref 135–145)
SPECIMEN EXPIRATION DATE: NORMAL
WBC # BLD AUTO: 7.1 10E3/UL (ref 4–11)

## 2024-05-29 PROCEDURE — 250N000009 HC RX 250: Performed by: EMERGENCY MEDICINE

## 2024-05-29 PROCEDURE — 250N000013 HC RX MED GY IP 250 OP 250 PS 637: Performed by: EMERGENCY MEDICINE

## 2024-05-29 PROCEDURE — 90715 TDAP VACCINE 7 YRS/> IM: CPT | Performed by: EMERGENCY MEDICINE

## 2024-05-29 PROCEDURE — 82077 ASSAY SPEC XCP UR&BREATH IA: CPT | Performed by: EMERGENCY MEDICINE

## 2024-05-29 PROCEDURE — 80307 DRUG TEST PRSMV CHEM ANLYZR: CPT | Performed by: EMERGENCY MEDICINE

## 2024-05-29 PROCEDURE — 96366 THER/PROPH/DIAG IV INF ADDON: CPT | Mod: 59

## 2024-05-29 PROCEDURE — 90471 IMMUNIZATION ADMIN: CPT | Performed by: EMERGENCY MEDICINE

## 2024-05-29 PROCEDURE — 36415 COLL VENOUS BLD VENIPUNCTURE: CPT | Performed by: EMERGENCY MEDICINE

## 2024-05-29 PROCEDURE — 99285 EMERGENCY DEPT VISIT HI MDM: CPT | Mod: 25

## 2024-05-29 PROCEDURE — 83735 ASSAY OF MAGNESIUM: CPT | Performed by: EMERGENCY MEDICINE

## 2024-05-29 PROCEDURE — 84703 CHORIONIC GONADOTROPIN ASSAY: CPT | Performed by: EMERGENCY MEDICINE

## 2024-05-29 PROCEDURE — 86900 BLOOD TYPING SEROLOGIC ABO: CPT | Performed by: EMERGENCY MEDICINE

## 2024-05-29 PROCEDURE — 80143 DRUG ASSAY ACETAMINOPHEN: CPT | Performed by: EMERGENCY MEDICINE

## 2024-05-29 PROCEDURE — 258N000003 HC RX IP 258 OP 636: Performed by: EMERGENCY MEDICINE

## 2024-05-29 PROCEDURE — 12011 RPR F/E/E/N/L/M 2.5 CM/<: CPT

## 2024-05-29 PROCEDURE — 250N000011 HC RX IP 250 OP 636: Performed by: EMERGENCY MEDICINE

## 2024-05-29 PROCEDURE — 96365 THER/PROPH/DIAG IV INF INIT: CPT | Mod: 59

## 2024-05-29 PROCEDURE — 76801 OB US < 14 WKS SINGLE FETUS: CPT

## 2024-05-29 PROCEDURE — 84702 CHORIONIC GONADOTROPIN TEST: CPT | Performed by: EMERGENCY MEDICINE

## 2024-05-29 PROCEDURE — 80053 COMPREHEN METABOLIC PANEL: CPT | Performed by: EMERGENCY MEDICINE

## 2024-05-29 PROCEDURE — 85025 COMPLETE CBC W/AUTO DIFF WBC: CPT | Performed by: EMERGENCY MEDICINE

## 2024-05-29 RX ORDER — PENICILLIN V POTASSIUM 500 MG/1
500 TABLET, FILM COATED ORAL 4 TIMES DAILY
Qty: 20 TABLET | Refills: 0 | Status: SHIPPED | OUTPATIENT
Start: 2024-05-29 | End: 2024-06-03

## 2024-05-29 RX ORDER — PENICILLIN V POTASSIUM 500 MG/1
500 TABLET, FILM COATED ORAL EVERY 6 HOURS SCHEDULED
Status: DISCONTINUED | OUTPATIENT
Start: 2024-05-29 | End: 2024-05-30 | Stop reason: HOSPADM

## 2024-05-29 RX ORDER — CHLORHEXIDINE GLUCONATE ORAL RINSE 1.2 MG/ML
15 SOLUTION DENTAL 2 TIMES DAILY
Qty: 118 ML | Refills: 0 | Status: SHIPPED | OUTPATIENT
Start: 2024-05-29

## 2024-05-29 RX ORDER — CHLORHEXIDINE GLUCONATE ORAL RINSE 1.2 MG/ML
15 SOLUTION DENTAL 2 TIMES DAILY
Status: DISCONTINUED | OUTPATIENT
Start: 2024-05-29 | End: 2024-05-30 | Stop reason: HOSPADM

## 2024-05-29 RX ADMIN — CHLORHEXIDINE GLUCONATE 15 ML: 1.2 SOLUTION ORAL at 20:55

## 2024-05-29 RX ADMIN — CLOSTRIDIUM TETANI TOXOID ANTIGEN (FORMALDEHYDE INACTIVATED), CORYNEBACTERIUM DIPHTHERIAE TOXOID ANTIGEN (FORMALDEHYDE INACTIVATED), BORDETELLA PERTUSSIS TOXOID ANTIGEN (GLUTARALDEHYDE INACTIVATED), BORDETELLA PERTUSSIS FILAMENTOUS HEMAGGLUTININ ANTIGEN (FORMALDEHYDE INACTIVATED), BORDETELLA PERTUSSIS PERTACTIN ANTIGEN, AND BORDETELLA PERTUSSIS FIMBRIAE 2/3 ANTIGEN 0.5 ML: 5; 2; 2.5; 5; 3; 5 INJECTION, SUSPENSION INTRAMUSCULAR at 16:12

## 2024-05-29 RX ADMIN — FOLIC ACID: 5 INJECTION, SOLUTION INTRAMUSCULAR; INTRAVENOUS; SUBCUTANEOUS at 16:10

## 2024-05-29 RX ADMIN — PENICILLIN V POTASSIUM 500 MG: 500 TABLET, FILM COATED ORAL at 18:57

## 2024-05-29 ASSESSMENT — ACTIVITIES OF DAILY LIVING (ADL)
ADLS_ACUITY_SCORE: 35

## 2024-05-29 NOTE — ED TRIAGE NOTES
Patient BIBA from home where someone in the house called PD concerned that patient took some medications to try and kill herself. When PD arrived patient told them she had drank vodka and had ingested a punch of pills to kill herself. Patient was just seen in the ER the other day for alcohol intoxication. For EMS patient told them she took klonopin, approx 2 pills, and vodka. Patient was concerned that she might have a seizure. BG 82, 18 G to left hand. VSS.

## 2024-05-29 NOTE — ED PROVIDER NOTES
Emergency Department Note      History of Present Illness     Chief Complaint  Suicidal and Alcohol Intoxication    HPI  Shala Mae is a 39 year old female with a history of alcohol use disorder and depression who arrives to the emergency department by ambulance on a  hold due to suicidal ideations and alcohol intoxication.  The paramedics report that someone in the sober house called 911 because they were concerned that the patient had overdosed on medications to try to kill herself.  With the medics arrived, they found her lying in her bed with dried blood around her mouth conscious but appearing to be intoxicated admitting that she had drink one fourth of a pint of vodka as well as ingested numerous prescription medications but that she could not initially relay what medications she had taken, but then she admitted to taking 2 Klonopin pills at 2 AM as well as drinking vodka.  She told the paramedics that she did this in an attempt to harm herself and the paramedics report that she was making suicidal statements and they did not feel she was able to care for herself so they placed her on a health officer hold and transported her here.  Her blood sugar was 82.  Patient is concerned that she may have had a seizure since she states she has had alcohol withdrawal seizures in the past.  She has a laceration inside her lower lip with associated facial pain.  She initially endorsed neck pain but then later denied neck pain.  She denies headache or vomiting.  She denies intentionally overdosing on any medications.  She initially denied suicidal thoughts to me, but then became tearful and said that she might be trying to kill herself with alcohol.  She states she has been binge drinking on alcohol for 4 days but have been sober for 5 months prior to that.  She was just seen in the ED 2 days ago for alcohol intoxication.  Her sponsor Josue is here in the ED and he reports that he is concerned about  "the patient having suicidal ideations.  He wants her kept for 72 hours.  The patient reports that she had a medication induced  through [facility] mid-March of this year.  She states she took medication March 15,  and  which did not initially take, so she was given another round of medication  to .  She reports that she has been having intermittent vaginal bleeding since then and had her last menstrual period which ended 3 days ago and lasted 3 days.  She has been having some intermittent left lower quadrant pelvic discomfort for an unknown duration of time.  No abnormal vaginal discharge.  No nausea or vomiting.    Independent Historian  EMS as detailed above.    Review of External Notes  I reviewed care everywhere and she was seen by [facility]gynecology on 24 by Dr. Mell Grace for evaluation after a \" miscarriage\" in which her note reports that the patient had her last normal menstrual period on 2024 then following this she had a positive UPT in February and then  she started having vaginal bleeding and blood for about 7 days with some persistent intermittent vaginal spot bleeding.  Her quantitative beta-hCG drawn at that visit was 295.  The clinic tried to call her to follow-up on this but the nurses notes reflect that they were never able to get a hold of her to come back for follow-up.    Past Medical History   Medical History and Problem List  Past Medical History:   Diagnosis Date    Bulimia (H28)     Chemical dependency (H)     Depression     Depressive disorder     IUD (intrauterine device) in place 2011       Medications  No current outpatient medications on file.      Surgical History   Past Surgical History:   Procedure Laterality Date    ORTHOPEDIC SURGERY       Physical Exam   Patient Vitals for the past 24 hrs:   BP Temp Temp src Pulse Resp SpO2 Height Weight   24 1730 124/85 -- -- 87 -- 96 % -- -- " "  05/29/24 1721 -- -- -- -- -- 97 % -- --   05/29/24 1706 105/69 -- -- -- -- 96 % -- --   05/29/24 1651 -- -- -- -- -- 95 % -- --   05/29/24 1646 -- -- -- -- -- 95 % -- --   05/29/24 1636 118/79 -- -- -- -- 97 % -- --   05/29/24 1511 117/79 98.5  F (36.9  C) Temporal 87 18 98 % 1.778 m (5' 10\") 68 kg (150 lb)     Physical Exam  Nursing note and vitals reviewed.  Constitutional:  Appears well-developed and well-nourished.   HENT:   Head:    Superficial facial abrasion linear just inferior to her lower lip.  Patient will extensible nontender.  TMs are clear.  Mouth/Throat:   Oropharynx: No tongue laceration.  She has a 2.5 cm jagged laceration inside the lower lip without active bleeding.  Teeth are intact and nontender.  Eyes:    Pupils are equal, round, and reactive to light.   Neck:    Nontender neck.  Normal range of motion. Neck supple.      No tracheal deviation present. No thyromegaly present.   Cardiovascular:  Ribs and clavicles nontender.  Normal rate, regular rhythm, no murmur   Pulmonary/Chest: Breath sounds are clear and equal without wheezes or crackles.  Abdominal:   Soft. Bowel sounds are normal. Exhibits no distension and      no mass. There is no tenderness.      There is no rebound and no guarding.   Musculoskeletal:  Exhibits no edema.  Arms, legs and back nontender.  Lymphadenopathy:  No cervical adenopathy.   Neurological:   Alert and oriented to person, place.  Slurred speech.  Cooperative and pleasant.  Moves all extremities with good strength.  Normal sensation.  Cranial nerves II through XII intact.  Skin:    Skin is warm and dry. No rash noted. No pallor.     Diagnostics   Lab Results   Labs Ordered and Resulted from Time of ED Arrival to Time of ED Departure   COMPREHENSIVE METABOLIC PANEL - Abnormal       Result Value    Sodium 143      Potassium 4.5      Carbon Dioxide (CO2) 23      Anion Gap 17 (*)     Urea Nitrogen 14.1      Creatinine 0.79      GFR Estimate >90      Calcium 9.5      " Chloride 103      Glucose 86      Alkaline Phosphatase 63      AST 64 (*)     ALT 36      Protein Total 7.5      Albumin 4.5      Bilirubin Total 0.4     ETHYL ALCOHOL LEVEL - Abnormal    Alcohol ethyl 0.33 (*)    ACETAMINOPHEN LEVEL - Abnormal    Acetaminophen <5.0 (*)    HCG QUALITATIVE PREGNANCY - Abnormal    hCG Serum Qualitative Positive (*)    MAGNESIUM - Abnormal    Magnesium 2.4 (*)    HCG QUANTITATIVE PREGNANCY - Abnormal    hCG Quantitative 18 (*)    URINE DRUG SCREEN PANEL - Normal    Amphetamines Urine Screen Negative      Barbituates Urine Screen Negative      Benzodiazepine Urine Screen Negative      Cannabinoids Urine Screen Negative      Cocaine Urine Screen Negative      Fentanyl Qual Urine Screen Negative      Opiates Urine Screen Negative      PCP Urine Screen Negative     CBC WITH PLATELETS AND DIFFERENTIAL    WBC Count 7.1      RBC Count 4.65      Hemoglobin 14.6      Hematocrit 43.2      MCV 93      MCH 31.4      MCHC 33.8      RDW 12.2      Platelet Count 313      % Neutrophils 51      % Lymphocytes 44      % Monocytes 3      % Eosinophils 1      % Basophils 1      % Immature Granulocytes 0      NRBCs per 100 WBC 0      Absolute Neutrophils 3.7      Absolute Lymphocytes 3.1      Absolute Monocytes 0.2      Absolute Eosinophils 0.0      Absolute Basophils 0.1      Absolute Immature Granulocytes 0.0      Absolute NRBCs 0.0     TYPE AND SCREEN, ADULT   ABO/RH TYPE AND SCREEN       Imaging  OB  US 1st trimester w transvag    (Results Pending)       Independent Interpretation  None  ED Course    Medications Administered  Medications   penicillin V (VEETID) tablet 500 mg (500 mg Oral $Given 5/29/24 1857)   Tdap (tetanus-diphtheria-acell pertussis) (ADACEL) injection 0.5 mL (0.5 mLs Intramuscular $Given 5/29/24 1612)   sodium chloride 0.9 % 1,000 mL with Infuvite Adult 10 mL, thiamine 100 mg, folic acid 1 mg, magnesium sulfate 2 g infusion ( Intravenous Stopped 5/29/24 1828)        Procedures  Procedures     Laceration Repair      Procedure: Laceration Repair    Indication: Laceration    Consent: Verbal    Tetanus status reviewed and given    Location: Intraoral lower lip laceration    Length: 2.5 cm    Preparation: Irrigation with NS.    Anesthesia/Sedation: Lidocaine - 1%      Treatment/Exploration: Wound explored, no foreign bodies found     Closure: The wound was closed with one layer. Skin/superficial layer was closed with 5 x 5-0 Polyglactin rapide (vicryl) absorbable  using Interrupted sutures.     Patient Status: The patient tolerated the procedure well: Yes. There were no complications.    Discussion of Management  DEC eval ordered, pending    Social Determinants of Health adding to complexity of care  Healthcare Access/Compliance and Social Connections/Isolation    ED Course     Medical Decision Making / Diagnosis     JAKOB Mae is a 39 year old female who arrives to the emergency department by ambulance on a health officer hold due to acute alcohol intoxication with delirium and suicidal ideations.  The found the patient to have a lower lip laceration which is likely due to a fall in the setting of significant alcohol intoxication with a blood alcohol of 0.33.  There was no tongue laceration and I doubt seizure since she has been binging on alcohol for the past several days so alcohol withdrawal seizure would be unlikely.  There was also no incontinence of urine.  I performed a laceration repair with absorbable sutures inside the lower lip and she was initiated on penicillin for prophylaxis of infection.  I also sent a prescription for penicillin and Peridex mouthwash to the pharmacy.  Her blood pregnancy test was positive in the setting of which she reports to be recent therapeutic  which was medication induced mid-March.  I reviewed care everywhere and she had an OB/Gyn clinic visit 2024 for evaluation of a miscarriage in which she had quantitative  beta-hCG blood pregnancy hormone test of 295 but the clinic was unable to reach her by phone for follow-up on this.  Her quant is only 18 today.  I ordered a pelvic ultrasound since she has been having some left lower quadrant pelvic discomfort, Rh type and her care was signed out to Dr. Gonzalez to follow-up on these results.  She will need to follow-up with gynecology to make sure her quantitative beta-hCG declines to 0.  She will also need DEC evaluation when she is clinically sober to evaluate suicidal ideations.  She does not want to go to detox upon my discussion with her.  She had initially complained of some neck pain and facial pain but on recheck she denied any neck pain, headache or facial pain so CT scans were canceled.    Disposition  Care of the patient was transferred to my colleague Dr. Gonzalez pending DEC evaluation, pelvic ultrasound and Rh type.     ICD-10 Codes:    ICD-10-CM    1. Alcohol intoxication with delirium (H24)  F10.921       2. Laceration of lower lip, initial encounter  S01.511A       3. Pregnancy test positive  Z32.01            Discharge Medications  New Prescriptions    No medications on file         MD Xavi Morse Cheri Lee, MD  05/29/24 5951

## 2024-05-30 NOTE — ED PROVIDER NOTES
7:50 PM -Case discussed with Lawrence from the DEC.  Patient was evaluated by DEC and denies suicidal ideation with plan.  Per Lawrence's report patient is refusing all detox and alcohol treatment resources as she already is set up with a sponsor through  and wishes to continue with .  Lawrence will have the  service line reach out to her for follow-up but the patient already has an established therapist that she sees with an established relationship.  At this time patient is refusing further chemical dependency resources.  No indication for psychiatric admission or involuntary hold.  Will continue to await pelvic ultrasound and continued sobriety.    Pelvic ultrasound results concerning for possible retained products of conception especially given her persistently positive quantitative hCG levels.  No evidence for complications such as heavy vaginal bleeding, infection/endometritis at this time.  Findings were discussed at length with the patient and I stressed the importance of very close follow-up with OB/GYN in 1 to 2 days for further evaluation and consideration for D&C or other management.  No indication for hospitalization at this time.  Patient expressed understanding of these findings and is agreeable with the plan for close outpatient follow-up.  Patient was discharged in stable condition.    Disposition:  Discharged to home    Clinical Impression:    ICD-10-CM    1. Alcohol intoxication with delirium (H24)  F10.921       2. Laceration of lower lip, initial encounter  S01.511A       3. Pregnancy test positive  Z32.01     Concern for retained products of conception.             Nash Gonzalez MD  05/30/24 0109
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
## 2024-05-30 NOTE — DISCHARGE INSTRUCTIONS
Your pregnancy test is positive still and your ultrasound shows that you may have retained products of conception in the uterus.  Close follow-up with your OB GYN is needed in 2 to 3 days as you may need a procedure (D&C) to remove the retained products of conception.

## 2024-09-12 ENCOUNTER — APPOINTMENT (OUTPATIENT)
Dept: URBAN - METROPOLITAN AREA CLINIC 255 | Age: 39
Setting detail: DERMATOLOGY
End: 2024-09-12

## 2024-09-12 VITALS — HEIGHT: 70 IN | WEIGHT: 150 LBS

## 2024-09-12 DIAGNOSIS — L57.8 OTHER SKIN CHANGES DUE TO CHRONIC EXPOSURE TO NONIONIZING RADIATION: ICD-10-CM

## 2024-09-12 DIAGNOSIS — L81.1 CHLOASMA: ICD-10-CM

## 2024-09-12 DIAGNOSIS — L70.0 ACNE VULGARIS: ICD-10-CM

## 2024-09-12 PROCEDURE — OTHER COUNSELING: OTHER

## 2024-09-12 PROCEDURE — OTHER OTC TREATMENT REGIMEN: OTHER

## 2024-09-12 PROCEDURE — 99204 OFFICE O/P NEW MOD 45 MIN: CPT

## 2024-09-12 PROCEDURE — OTHER PATIENT SPECIFIC COUNSELING: OTHER

## 2024-09-12 PROCEDURE — OTHER MIPS QUALITY: OTHER

## 2024-09-12 PROCEDURE — OTHER PRESCRIPTION: OTHER

## 2024-09-12 PROCEDURE — OTHER PRESCRIPTION MEDICATION MANAGEMENT: OTHER

## 2024-09-12 PROCEDURE — OTHER ADDITIONAL NOTES: OTHER

## 2024-09-12 RX ORDER — TRETINOIN 0.5 MG/G
CREAM TOPICAL
Qty: 45 | Refills: 8 | Status: ERX | COMMUNITY
Start: 2024-09-12

## 2024-09-12 ASSESSMENT — LOCATION SIMPLE DESCRIPTION DERM
LOCATION SIMPLE: RIGHT CHEEK
LOCATION SIMPLE: LEFT ANTERIOR NECK
LOCATION SIMPLE: CHEST
LOCATION SIMPLE: LEFT CHEEK
LOCATION SIMPLE: RIGHT ANTERIOR NECK

## 2024-09-12 ASSESSMENT — LOCATION ZONE DERM
LOCATION ZONE: NECK
LOCATION ZONE: FACE
LOCATION ZONE: TRUNK

## 2024-09-12 ASSESSMENT — LOCATION DETAILED DESCRIPTION DERM
LOCATION DETAILED: RIGHT INFERIOR LATERAL NECK
LOCATION DETAILED: RIGHT INFERIOR CENTRAL MALAR CHEEK
LOCATION DETAILED: LEFT INFERIOR CENTRAL MALAR CHEEK
LOCATION DETAILED: LEFT INFERIOR ANTERIOR NECK
LOCATION DETAILED: UPPER STERNUM

## 2024-09-12 ASSESSMENT — SEVERITY ASSESSMENT OVERALL AMONG ALL PATIENTS
IN YOUR EXPERIENCE, AMONG ALL PATIENTS YOU HAVE SEEN WITH THIS CONDITION, HOW SEVERE IS THIS PATIENT'S CONDITION?: FEW INFLAMMATORY LESIONS, SOME NONINFLAMMATORY

## 2024-09-12 NOTE — HPI: PIMPLES (ACNE)
Is This A New Presentation, Or A Follow-Up?: Acne
Additional Comments (Use Complete Sentences): She states her acne will get worse when she is stressed and diet. She states her skin has improved since cutting back on her sugar intake.

## 2024-09-12 NOTE — PROCEDURE: COUNSELING
Azithromycin Counseling:  I discussed with the patient the risks of azithromycin including but not limited to GI upset, allergic reaction, drug rash, diarrhea, and yeast infections.
Dapsone Pregnancy And Lactation Text: This medication is Pregnancy Category C and is not considered safe during pregnancy or breast feeding.
Tazorac Pregnancy And Lactation Text: This medication is not safe during pregnancy. It is unknown if this medication is excreted in breast milk.
Topical Retinoid Pregnancy And Lactation Text: This medication is Pregnancy Category C. It is unknown if this medication is excreted in breast milk.
Winlevi Counseling:  I discussed with the patient the risks of topical clascoterone including but not limited to erythema, scaling, itching, and stinging. Patient voiced their understanding.
Sarecycline Pregnancy And Lactation Text: This medication is Pregnancy Category D and not consider safe during pregnancy. It is also excreted in breast milk.
Azelaic Acid Counseling: Patient counseled that medicine may cause skin irritation and to avoid applying near the eyes.  In the event of skin irritation, the patient was advised to reduce the amount of the drug applied or use it less frequently.   The patient verbalized understanding of the proper use and possible adverse effects of azelaic acid.  All of the patient's questions and concerns were addressed.
Isotretinoin Counseling: Patient should get monthly blood tests, not donate blood, not drive at night if vision affected, not share medication, and not undergo elective surgery for 6 months after tx completed. Side effects reviewed, pt to contact office should one occur.
Birth Control Pills Pregnancy And Lactation Text: This medication should be avoided if pregnant and for the first 30 days post-partum.
Bactrim Pregnancy And Lactation Text: This medication is Pregnancy Category D and is known to cause fetal risk.  It is also excreted in breast milk.
Aklief counseling:  Patient advised to apply a pea-sized amount only at bedtime and wait 30 minutes after washing their face before applying.  If too drying, patient may add a non-comedogenic moisturizer.  The most commonly reported side effects including irritation, redness, scaling, dryness, stinging, burning, itching, and increased risk of sunburn.  The patient verbalized understanding of the proper use and possible adverse effects of retinoids.  All of the patient's questions and concerns were addressed.
Erythromycin Counseling:  I discussed with the patient the risks of erythromycin including but not limited to GI upset, allergic reaction, drug rash, diarrhea, increase in liver enzymes, and yeast infections.
Detail Level: Zone
High Dose Vitamin A Pregnancy And Lactation Text: High dose vitamin A therapy is contraindicated during pregnancy and breast feeding.
Azithromycin Pregnancy And Lactation Text: This medication is considered safe during pregnancy and is also secreted in breast milk.
Doxycycline Counseling:  Patient counseled regarding possible photosensitivity and increased risk for sunburn.  Patient instructed to avoid sunlight, if possible.  When exposed to sunlight, patients should wear protective clothing, sunglasses, and sunscreen.  The patient was instructed to call the office immediately if the following severe adverse effects occur:  hearing changes, easy bruising/bleeding, severe headache, or vision changes.  The patient verbalized understanding of the proper use and possible adverse effects of doxycycline.  All of the patient's questions and concerns were addressed.
Tetracycline Counseling: Patient counseled regarding possible photosensitivity and increased risk for sunburn.  Patient instructed to avoid sunlight, if possible.  When exposed to sunlight, patients should wear protective clothing, sunglasses, and sunscreen.  The patient was instructed to call the office immediately if the following severe adverse effects occur:  hearing changes, easy bruising/bleeding, severe headache, or vision changes.  The patient verbalized understanding of the proper use and possible adverse effects of tetracycline.  All of the patient's questions and concerns were addressed. Patient understands to avoid pregnancy while on therapy due to potential birth defects.
Benzoyl Peroxide Pregnancy And Lactation Text: This medication is Pregnancy Category C. It is unknown if benzoyl peroxide is excreted in breast milk.
Topical Sulfur Applications Counseling: Topical Sulfur Counseling: Patient counseled that this medication may cause skin irritation or allergic reactions.  In the event of skin irritation, the patient was advised to reduce the amount of the drug applied or use it less frequently.   The patient verbalized understanding of the proper use and possible adverse effects of topical sulfur application.  All of the patient's questions and concerns were addressed.
Dapsone Counseling: I discussed with the patient the risks of dapsone including but not limited to hemolytic anemia, agranulocytosis, rashes, methemoglobinemia, kidney failure, peripheral neuropathy, headaches, GI upset, and liver toxicity.  Patients who start dapsone require monitoring including baseline LFTs and weekly CBCs for the first month, then every month thereafter.  The patient verbalized understanding of the proper use and possible adverse effects of dapsone.  All of the patient's questions and concerns were addressed.
Use Enhanced Medication Counseling?: No
Isotretinoin Pregnancy And Lactation Text: This medication is Pregnancy Category X and is considered extremely dangerous during pregnancy. It is unknown if it is excreted in breast milk.
Winlevi Pregnancy And Lactation Text: This medication is considered safe during pregnancy and breastfeeding.
Spironolactone Counseling: Patient advised regarding risks of diarrhea, abdominal pain, hyperkalemia, birth defects (for female patients), liver toxicity and renal toxicity. The patient may need blood work to monitor liver and kidney function and potassium levels while on therapy. The patient verbalized understanding of the proper use and possible adverse effects of spironolactone.  All of the patient's questions and concerns were addressed.
Azelaic Acid Pregnancy And Lactation Text: This medication is considered safe during pregnancy and breast feeding.
Topical Clindamycin Counseling: Patient counseled that this medication may cause skin irritation or allergic reactions.  In the event of skin irritation, the patient was advised to reduce the amount of the drug applied or use it less frequently.   The patient verbalized understanding of the proper use and possible adverse effects of clindamycin.  All of the patient's questions and concerns were addressed.
Erythromycin Pregnancy And Lactation Text: This medication is Pregnancy Category B and is considered safe during pregnancy. It is also excreted in breast milk.
Birth Control Pills Counseling: Birth Control Pill Counseling: I discussed with the patient the potential side effects of OCPs including but not limited to increased risk of stroke, heart attack, thrombophlebitis, deep venous thrombosis, hepatic adenomas, breast changes, GI upset, headaches, and depression.  The patient verbalized understanding of the proper use and possible adverse effects of OCPs. All of the patient's questions and concerns were addressed.
Sarecycline Counseling: Patient advised regarding possible photosensitivity and discoloration of the teeth, skin, lips, tongue and gums.  Patient instructed to avoid sunlight, if possible.  When exposed to sunlight, patients should wear protective clothing, sunglasses, and sunscreen.  The patient was instructed to call the office immediately if the following severe adverse effects occur:  hearing changes, easy bruising/bleeding, severe headache, or vision changes.  The patient verbalized understanding of the proper use and possible adverse effects of sarecycline.  All of the patient's questions and concerns were addressed.
Aklief Pregnancy And Lactation Text: It is unknown if this medication is safe to use during pregnancy.  It is unknown if this medication is excreted in breast milk.  Breastfeeding women should use the topical cream on the smallest area of the skin for the shortest time needed while breastfeeding.  Do not apply to nipple and areola.
Tazorac Counseling:  Patient advised that medication is irritating and drying.  Patient may need to apply sparingly and wash off after an hour before eventually leaving it on overnight.  The patient verbalized understanding of the proper use and possible adverse effects of tazorac.  All of the patient's questions and concerns were addressed.
Topical Retinoid counseling:  Patient advised to apply a pea-sized amount only at bedtime and wait 30 minutes after washing their face before applying.  If too drying, patient may add a non-comedogenic moisturizer. The patient verbalized understanding of the proper use and possible adverse effects of retinoids.  All of the patient's questions and concerns were addressed.
Topical Sulfur Applications Pregnancy And Lactation Text: This medication is Pregnancy Category C and has an unknown safety profile during pregnancy. It is unknown if this topical medication is excreted in breast milk.
Topical Clindamycin Pregnancy And Lactation Text: This medication is Pregnancy Category B and is considered safe during pregnancy. It is unknown if it is excreted in breast milk.
Spironolactone Pregnancy And Lactation Text: This medication can cause feminization of the male fetus and should be avoided during pregnancy. The active metabolite is also found in breast milk.
Benzoyl Peroxide Counseling: Patient counseled that medicine may cause skin irritation and bleach clothing.  In the event of skin irritation, the patient was advised to reduce the amount of the drug applied or use it less frequently.   The patient verbalized understanding of the proper use and possible adverse effects of benzoyl peroxide.  All of the patient's questions and concerns were addressed.
High Dose Vitamin A Counseling: Side effects reviewed, pt to contact office should one occur.
Minocycline Counseling: Patient advised regarding possible photosensitivity and discoloration of the teeth, skin, lips, tongue and gums.  Patient instructed to avoid sunlight, if possible.  When exposed to sunlight, patients should wear protective clothing, sunglasses, and sunscreen.  The patient was instructed to call the office immediately if the following severe adverse effects occur:  hearing changes, easy bruising/bleeding, severe headache, or vision changes.  The patient verbalized understanding of the proper use and possible adverse effects of minocycline.  All of the patient's questions and concerns were addressed.
Bactrim Counseling:  I discussed with the patient the risks of sulfa antibiotics including but not limited to GI upset, allergic reaction, drug rash, diarrhea, dizziness, photosensitivity, and yeast infections.  Rarely, more serious reactions can occur including but not limited to aplastic anemia, agranulocytosis, methemoglobinemia, blood dyscrasias, liver or kidney failure, lung infiltrates or desquamative/blistering drug rashes.
Doxycycline Pregnancy And Lactation Text: This medication is Pregnancy Category D and not consider safe during pregnancy. It is also excreted in breast milk but is considered safe for shorter treatment courses.
Detail Level: Simple
Detail Level: Detailed

## 2024-09-12 NOTE — PROCEDURE: PATIENT SPECIFIC COUNSELING
- Recommended doing a VI peel on the face, neck, and décolleté in the fall \\n- Discussed patient should DC tretin-a at least a week prior peel
Detail Level: Zone

## 2024-09-12 NOTE — PROCEDURE: ADDITIONAL NOTES
Render Risk Assessment In Note?: no
Additional Notes: - Discussed [employer] [facility]\\n- Discussed VIPeel\\n- Discussed Fraxel
Detail Level: Simple

## 2024-09-12 NOTE — PROCEDURE: PRESCRIPTION MEDICATION MANAGEMENT
Initiate Treatment: Apply a pea sized amount of Retin-A 0.05 % topical cream to face 3-4 nights per week. Increase to nightly as tolerated. Follow with moisturizer.
Render In Strict Bullet Format?: No
Detail Level: Zone

## 2024-09-12 NOTE — PROCEDURE: OTC TREATMENT REGIMEN
Patient Specific Otc Recommendations (Will Not Stick From Patient To Patient): If skin does not tolerate Tretinoin 0.05% well, consider [employer] AlphaRet as an alternative
Detail Level: Zone

## 2024-10-08 ENCOUNTER — RX ONLY (RX ONLY)
Age: 39
End: 2024-10-08

## 2024-10-08 RX ORDER — TRETINOIN 0.25 MG/G
CREAM TOPICAL QHS
Qty: 45 | Refills: 5 | Status: ERX | COMMUNITY
Start: 2024-10-08